# Patient Record
Sex: FEMALE | Race: WHITE | NOT HISPANIC OR LATINO | Employment: OTHER | URBAN - METROPOLITAN AREA
[De-identification: names, ages, dates, MRNs, and addresses within clinical notes are randomized per-mention and may not be internally consistent; named-entity substitution may affect disease eponyms.]

---

## 2017-01-31 RX ORDER — OMEPRAZOLE 20 MG/1
CAPSULE, DELAYED RELEASE ORAL
Qty: 90 CAP | Refills: 1 | Status: SHIPPED | OUTPATIENT
Start: 2017-01-31 | End: 2017-08-01 | Stop reason: SDUPTHER

## 2017-01-31 NOTE — TELEPHONE ENCOUNTER
Desi- You have upcoming appt with pt and have never prescribed for this pt before, please refill as you see fit.

## 2017-01-31 NOTE — TELEPHONE ENCOUNTER
Was the patient seen in the last year in this department? Yes     Does patient have an active prescription for medications requested? No     Received Request Via: Pharmacy      Pt met protocol?: Yes    Jeb wilde

## 2017-02-17 PROBLEM — Z79.891 CHRONIC USE OF OPIATE DRUGS THERAPEUTIC PURPOSES: Status: ACTIVE | Noted: 2017-02-17

## 2017-04-03 ENCOUNTER — HOSPITAL ENCOUNTER (OUTPATIENT)
Dept: RADIOLOGY | Facility: MEDICAL CENTER | Age: 52
End: 2017-04-03

## 2017-04-19 ENCOUNTER — OFFICE VISIT (OUTPATIENT)
Dept: MEDICAL GROUP | Facility: PHYSICIAN GROUP | Age: 52
End: 2017-04-19
Payer: COMMERCIAL

## 2017-04-19 VITALS
WEIGHT: 293 LBS | TEMPERATURE: 98.4 F | BODY MASS INDEX: 48.82 KG/M2 | HEIGHT: 65 IN | OXYGEN SATURATION: 92 % | DIASTOLIC BLOOD PRESSURE: 32 MMHG | SYSTOLIC BLOOD PRESSURE: 131 MMHG | RESPIRATION RATE: 14 BRPM | HEART RATE: 88 BPM

## 2017-04-19 DIAGNOSIS — Z79.891 CHRONIC USE OF OPIATE DRUGS THERAPEUTIC PURPOSES: ICD-10-CM

## 2017-04-19 DIAGNOSIS — M47.812 DJD (DEGENERATIVE JOINT DISEASE), CERVICAL: ICD-10-CM

## 2017-04-19 PROCEDURE — 99213 OFFICE O/P EST LOW 20 MIN: CPT | Performed by: INTERNAL MEDICINE

## 2017-04-19 ASSESSMENT — PAIN SCALES - GENERAL: PAINLEVEL: 5=MODERATE PAIN

## 2017-04-19 NOTE — PROGRESS NOTES
Chief Complaint   Patient presents with   • Follow-Up     surgical clearance       HISTORY OF PRESENT ILLNESS: Patient is a 51 y.o. female patient who presents today to discuss the evaluation and management of:    1. DJD (degenerative joint disease), cervical    Patient is scheduled for cervical spine fusion by Dr. Benítez in approximately 3 weeks' time. She is here today for surgical clearance. She has had several previous procedures done however has developed pain in her neck radiating down her left arm that is been resistant to medication and to recent steroid injections. Patient does not have a personal history of heart disease, diabetes mellitus, peripheral vascular disease, asthma/emphysema, or syncope. She is hypothyroid however clinically euthyroid with last TSH within normal limits. Her primary problem has been chronic pain from her cervical spine and lumbar areas.    2. Chronic use of opiate drugs therapeutic purposes    Patient currently takes Flexeril and Norco 10/325 3 times a day. She also takes Lyrica twice a day. Her last urine drug screen was 12 2016. She has an appointment next week to see Dr. Osman for refill of her medications. Her last refills were in December through Dr. Russ.      Patient Active Problem List    Diagnosis Date Noted   • Chronic use of opiate drugs therapeutic purposes 02/17/2017   • Thyroid activity decreased 12/18/2015   • Other osteoarthritis of spine, lumbar region 08/28/2015   • Other migraine without status migrainosus, not intractable 08/28/2015   • Chronic pain 12/02/2014   • Long term current use of opiate analgesic 12/02/2014   • Fibromyalgia 04/04/2013   • DJD (degenerative joint disease), cervical 04/04/2013   • Myalgia and myositis 09/19/2011   • ENDOMETRIOSIS 09/13/2011   • PCOS (polycystic ovarian syndrome) 02/01/2011   • Family history of diabetes mellitus (DM) 02/01/2011   • Anxiety disorder 02/01/2011   • GERD (gastroesophageal reflux disease) 02/01/2011       Allergies:Latex    Current meds including changes today  Current Outpatient Prescriptions   Medication Sig Dispense Refill   • omeprazole (PRILOSEC) 20 MG delayed-release capsule TAKE ONE CAPSULE BY MOUTH DAILY 90 Cap 1   • sertraline (ZOLOFT) 100 MG Tab TAKE TWO TABLETS BY MOUTH DAILY 180 Tab 4   • trazodone (DESYREL) 50 MG Tab TAKE ONE TABLET BY MOUTH AT BEDTIME 90 Tab 4   • hydrocodone/acetaminophen (NORCO)  MG Tab Take 1 Tab by mouth every 6 hours as needed. 120 Tab 0   • hydrocodone/acetaminophen (NORCO)  MG Tab Take 1 Tab by mouth every 6 hours as needed. 120 Tab 0   • hydrocodone/acetaminophen (NORCO)  MG Tab Take 1 Tab by mouth every 6 hours as needed. 120 Tab 0   • pregabalin (LYRICA) 150 MG Cap Take 1 Cap by mouth 3 times a day. 270 Cap 1   • ibuprofen (MOTRIN) 800 MG Tab TAKE ONE TABLET BY MOUTH EVERY 8 HOURS AS NEEDED FOR MILD PAIN 270 Tab 3   • levothyroxine (SYNTHROID) 25 MCG Tab TAKE ONE TABLET BY MOUTH DAILY WITH A 200 MCG TABLET 90 Tab 3   • levothyroxine (SYNTHROID) 200 MCG Tab TAKE ONE TABLET BY MOUTH EVERY MORNING ON AN EMPTY STOMACH 90 Tab 3   • cyclobenzaprine (FLEXERIL) 10 MG Tab TAKE ONE TABLET BY MOUTH THREE TIMES A DAY AS NEEDED FOR MUSCLE SPASMS 270 Tab 3   • ranitidine (ZANTAC) 150 MG capsule Take 2 Caps by mouth 2 Times a Day. 360 Cap 4   • topiramate (TOPAMAX) 50 MG tablet TAKE ONE TABLET BY MOUTH ONCE A DAY 90 Tab 4   • norethindrone-ethinyl estradiol (CYCLAFEM 1/35) 1-35 MG-MCG per tablet TAKE ONE TABLET BY MOUTH ONCE A DAY 84 Tab 5   • POTASSIUM CHLORIDE by Does not apply route.     • oxycodone (OXY-IR) 15 MG immediate release tablet Take 15 mg by mouth every four hours as needed.     • BIOTIN by Does not apply route.       No current facility-administered medications for this visit.     Social History   Substance Use Topics   • Smoking status: Never Smoker    • Smokeless tobacco: Never Used      Comment: avoid all tobacco products   • Alcohol Use: 0.0 oz/week      "0 Standard drinks or equivalent per week      Comment: occasionally     Social History     Social History Narrative       Family History   Problem Relation Age of Onset   • Hyperlipidemia Mother    • Diabetes Sister    • Hypertension Sister    • Hyperlipidemia Sister    • Diabetes Brother    • Hypertension Brother    • Hyperlipidemia Brother    • Heart Disease Maternal Aunt    • Heart Disease Maternal Grandmother    • Diabetes Paternal Grandmother    • Cancer Neg Hx    • Stroke Neg Hx        Review of Systems:  No chest pain, No shortness of breath, No dyspnea on exertion  Gastrointestinal ROS: No abdominal pain, No nausea, vomiting, diarrhea, or constipation        Exam:    Obese pleasant female no distress  Blood pressure 131/32, pulse 88, temperature 36.9 °C (98.4 °F), resp. rate 14, height 1.651 m (5' 5\"), weight 138.347 kg (305 lb), SpO2 92 %.  General:  Well nourished, well developed female in NAD affect and mood within normal limits  Head is grossly normal.  Neck: Supple without adenopathy  Pulmonary: Clear to ausculation.  Normal effort. No rales, rhonchi, or wheezing.  Cardiovascular: Regular rate and rhythm without murmur.   Extremities: no clubbing, cyanosis, or edema.  Neuro: moves all extremities symmetrically    Please note that this dictation was created using voice recognition software. I have made every reasonable attempt to correct obvious errors, but I expect that there are errors of grammar and possibly content that I did not discover before finalizing the note.    Assessment/Plan:  1. DJD (degenerative joint disease), cervical    Patient is cleared for the proposed cervical spine surgery in the low risk category. She will have her preop labs performed at her surgeon's office.    2. Chronic use of opiate drugs therapeutic purposes    Continue current medical regimen, patient has upcoming appointment with her PCP for refills.    Followup: Patient has appointment with PCP next week.      "

## 2017-04-19 NOTE — MR AVS SNAPSHOT
"        Gail Elias   2017 3:00 PM   Office Visit   MRN: [id number]    Department:  [facility]   Dept Phone:  [phone]    Description:  Female : 1965   Provider:  Bertha Hui M.D.           Reason for Visit     Follow-Up surgical clearance      Allergies as of 2017     Allergen Noted Reactions    Latex 2012   Hives      You were diagnosed with     DJD (degenerative joint disease), cervical   [534205]       Chronic use of opiate drugs therapeutic purposes   [2355293]         Vital Signs     Blood Pressure Pulse Temperature Respirations Height Weight    131/32 mmHg 88 36.9 °C (98.4 °F) 14 1.651 m (5' 5\") 138.347 kg (305 lb)    Body Mass Index Oxygen Saturation Smoking Status             50.75 kg/m2 92% Never Smoker          Basic Information     Date Of Birth Sex Race Ethnicity Preferred Language    1965 Female White Non- English      Your appointments     2017  3:20 PM   Established Patient with Desi Osman D.O.   Formerly [facility])    [address] [place] [address], [address]  [facility] [id number] [phone]           You will be receiving a confirmation call a few days before your appointment from our automated call confirmation system.              Problem List              ICD-10-CM Priority Class Noted - Resolved    PCOS (polycystic ovarian syndrome) E28.2   2011 - Present    Family history of diabetes mellitus (DM) Z83.3   2011 - Present    Anxiety disorder F41.9   2011 - Present    GERD (gastroesophageal reflux disease) K21.9   2011 - Present    ENDOMETRIOSIS    2011 - Present    Myalgia and myositis ARI3107   2011 - Present    Fibromyalgia M79.7   2013 - Present    DJD (degenerative joint disease), cervical M50.30   2013 - Present    Chronic pain G89.29   2014 - Present    Long term current use of opiate analgesic Z79.891   2014 - Present    Other osteoarthritis of spine, " lumbar region M47.896   8/28/2015 - Present    Other migraine without status migrainosus, not intractable G43.809   8/28/2015 - Present    Thyroid activity decreased E03.9   12/18/2015 - Present    Chronic use of opiate drugs therapeutic purposes Z79.899   2/17/2017 - Present      Health Maintenance        Date Due Completion Dates    PAP SMEAR 12/5/2017 (Originally 8/26/1986) ---    MAMMOGRAM 12/6/2017 (Originally 8/26/2005) ---    COLONOSCOPY 12/13/2017 (Originally 8/26/2015) ---    IMM DTaP/Tdap/Td Vaccine (2 - Td) 4/9/2025 4/9/2015            Current Immunizations     Influenza Vaccine Adult HD 12/21/2016    Influenza Vaccine Quad Inj (Pf) 12/2/2014    Influenza Vaccine Quad Inj (Preserved) 12/11/2015    Tdap Vaccine 4/9/2015      Below and/or attached are the medications your provider expects you to take. Review all of your home medications and newly ordered medications with your provider and/or pharmacist. Follow medication instructions as directed by your provider and/or pharmacist. Please keep your medication list with you and share with your provider. Update the information when medications are discontinued, doses are changed, or new medications (including over-the-counter products) are added; and carry medication information at all times in the event of emergency situations     Allergies:  LATEX - Hives               Medications  Valid as of: April 19, 2017 -  4:30 PM    Generic Name Brand Name Tablet Size Instructions for use    Biotin   by Does not apply route.        Cyclobenzaprine HCl (Tab) FLEXERIL 10 MG TAKE ONE TABLET BY MOUTH THREE TIMES A DAY AS NEEDED FOR MUSCLE SPASMS        Hydrocodone-Acetaminophen (Tab) NORCO  MG Take 1 Tab by mouth every 6 hours as needed.        Hydrocodone-Acetaminophen (Tab) NORCO  MG Take 1 Tab by mouth every 6 hours as needed.        Hydrocodone-Acetaminophen (Tab) NORCO  MG Take 1 Tab by mouth every 6 hours as needed.        Ibuprofen (Tab) MOTRIN  800 MG TAKE ONE TABLET BY MOUTH EVERY 8 HOURS AS NEEDED FOR MILD PAIN        Levothyroxine Sodium (Tab) SYNTHROID 25 MCG TAKE ONE TABLET BY MOUTH DAILY WITH A 200 MCG TABLET        Levothyroxine Sodium (Tab) SYNTHROID 200 MCG TAKE ONE TABLET BY MOUTH EVERY MORNING ON AN EMPTY STOMACH        Norethindrone-Eth Estradiol (Tab) ORTHO-NOVUM 1-35 TAB, NORTREL 1-35 TAB 1-35 MG-MCG TAKE ONE TABLET BY MOUTH ONCE A DAY        Omeprazole (CAPSULE DELAYED RELEASE) PRILOSEC 20 MG TAKE ONE CAPSULE BY MOUTH DAILY        OxyCODONE HCl (Tab) OXY-IR 15 MG Take 15 mg by mouth every four hours as needed.        Potassium Chloride   by Does not apply route.        Pregabalin (Cap) LYRICA 150 MG Take 1 Cap by mouth 3 times a day.        RaNITidine HCl (Cap) ZANTAC 150 MG Take 2 Caps by mouth 2 Times a Day.        Sertraline HCl (Tab) ZOLOFT 100 MG TAKE TWO TABLETS BY MOUTH DAILY        Topiramate (Tab) TOPAMAX 50 MG TAKE ONE TABLET BY MOUTH ONCE A DAY        TraZODone HCl (Tab) DESYREL 50 MG TAKE ONE TABLET BY MOUTH AT BEDTIME        .                 Medicines prescribed today were sent to:     [facility] #[id number] - [place], [place] - [address]    [address] [facility] [address]    Phone: [phone] Fax: [phone]    Open 24 Hours?: No      Medication refill instructions:       If your prescription bottle indicates you have medication refills left, it is not necessary to call your provider’s office. Please contact your pharmacy and they will refill your medication.    If your prescription bottle indicates you do not have any refills left, you may request refills at any time through one of the following ways: The online [employer] system (except Urgent Care), by calling your provider’s office, or by asking your pharmacy to contact your provider’s office with a refill request. Medication refills are processed only during regular business hours and may not be available until the next business day. Your provider may request additional  information or to have a follow-up visit with you prior to refilling your medication.   *Please Note: Medication refills are assigned a new Rx number when refilled electronically. Your pharmacy may indicate that no refills were authorized even though a new prescription for the same medication is available at the pharmacy. Please request the medicine by name with the pharmacy before contacting your provider for a refill.           [employer] Access Code: Activation code not generated  Current [employer] Status: Active

## 2017-04-24 ENCOUNTER — OFFICE VISIT (OUTPATIENT)
Dept: MEDICAL GROUP | Facility: PHYSICIAN GROUP | Age: 52
End: 2017-04-24
Payer: COMMERCIAL

## 2017-04-24 VITALS
BODY MASS INDEX: 48.82 KG/M2 | HEIGHT: 65 IN | SYSTOLIC BLOOD PRESSURE: 130 MMHG | WEIGHT: 293 LBS | OXYGEN SATURATION: 89 % | TEMPERATURE: 97.9 F | HEART RATE: 89 BPM | DIASTOLIC BLOOD PRESSURE: 70 MMHG | RESPIRATION RATE: 16 BRPM

## 2017-04-24 DIAGNOSIS — M47.896 OTHER OSTEOARTHRITIS OF SPINE, LUMBAR REGION: ICD-10-CM

## 2017-04-24 DIAGNOSIS — Z79.891 CHRONIC USE OF OPIATE DRUGS THERAPEUTIC PURPOSES: ICD-10-CM

## 2017-04-24 DIAGNOSIS — M47.812 DJD (DEGENERATIVE JOINT DISEASE), CERVICAL: ICD-10-CM

## 2017-04-24 DIAGNOSIS — M79.7 FIBROMYALGIA: ICD-10-CM

## 2017-04-24 PROCEDURE — 99214 OFFICE O/P EST MOD 30 MIN: CPT | Performed by: FAMILY MEDICINE

## 2017-04-24 RX ORDER — PREGABALIN 150 MG/1
150 CAPSULE ORAL 3 TIMES DAILY
Qty: 270 CAP | Refills: 1 | Status: SHIPPED | OUTPATIENT
Start: 2017-04-24 | End: 2018-01-05 | Stop reason: SDUPTHER

## 2017-04-24 RX ORDER — HYDROCODONE BITARTRATE AND ACETAMINOPHEN 10; 325 MG/1; MG/1
1 TABLET ORAL EVERY 6 HOURS PRN
Qty: 120 TAB | Refills: 0 | Status: SHIPPED | OUTPATIENT
Start: 2017-04-24 | End: 2017-09-20 | Stop reason: SDUPTHER

## 2017-04-24 NOTE — MR AVS SNAPSHOT
"        Gail Elias   2017 3:20 PM   Office Visit   MRN: [id number]    Department:  [facility]   Dept Phone:  [phone]    Description:  Female : 1965   Provider:  Desi Osman D.O.           Reason for Visit     Medication Refill           Allergies as of 2017     Allergen Noted Reactions    Latex 2012   Hives      You were diagnosed with     Chronic use of opiate drugs therapeutic purposes   [2421507]       DJD (degenerative joint disease), cervical   [911951]       Other osteoarthritis of spine, lumbar region   [0543098]       Fibromyalgia   [860492]         Vital Signs     Blood Pressure Pulse Temperature Respirations Height Weight    130/70 mmHg 89 36.6 °C (97.9 °F) 16 1.651 m (5' 5\") 138.347 kg (305 lb)    Body Mass Index Oxygen Saturation Smoking Status             50.75 kg/m2 89% Never Smoker          Basic Information     Date Of Birth Sex Race Ethnicity Preferred Language    1965 Female White Non- English      Problem List              ICD-10-CM Priority Class Noted - Resolved    PCOS (polycystic ovarian syndrome) E28.2   2011 - Present    Family history of diabetes mellitus (DM) Z83.3   2011 - Present    Anxiety disorder F41.9   2011 - Present    GERD (gastroesophageal reflux disease) K21.9   2011 - Present    ENDOMETRIOSIS    2011 - Present    Myalgia and myositis OHV7357   2011 - Present    Fibromyalgia M79.7   2013 - Present    DJD (degenerative joint disease), cervical M50.30   2013 - Present    Chronic pain G89.29   2014 - Present    Other osteoarthritis of spine, lumbar region M47.896   2015 - Present    Other migraine without status migrainosus, not intractable G43.809   2015 - Present    Thyroid activity decreased E03.9   2015 - Present    Chronic use of opiate drugs therapeutic purposes Z79.899   2017 - Present      Health Maintenance        Date Due Completion Dates    PAP SMEAR " 12/5/2017 (Originally 8/26/1986) ---    MAMMOGRAM 12/6/2017 (Originally 8/26/2005) ---    COLONOSCOPY 12/13/2017 (Originally 8/26/2015) ---    IMM DTaP/Tdap/Td Vaccine (2 - Td) 4/9/2025 4/9/2015            Current Immunizations     Influenza Vaccine Adult HD 12/21/2016    Influenza Vaccine Quad Inj (Pf) 12/2/2014    Influenza Vaccine Quad Inj (Preserved) 12/11/2015    Tdap Vaccine 4/9/2015      Below and/or attached are the medications your provider expects you to take. Review all of your home medications and newly ordered medications with your provider and/or pharmacist. Follow medication instructions as directed by your provider and/or pharmacist. Please keep your medication list with you and share with your provider. Update the information when medications are discontinued, doses are changed, or new medications (including over-the-counter products) are added; and carry medication information at all times in the event of emergency situations     Allergies:  LATEX - Hives               Medications  Valid as of: April 24, 2017 -  6:09 PM    Generic Name Brand Name Tablet Size Instructions for use    Biotin   by Does not apply route.        Cyclobenzaprine HCl (Tab) FLEXERIL 10 MG TAKE ONE TABLET BY MOUTH THREE TIMES A DAY AS NEEDED FOR MUSCLE SPASMS        Hydrocodone-Acetaminophen (Tab) NORCO  MG Take 1 Tab by mouth every 6 hours as needed.        Hydrocodone-Acetaminophen (Tab) NORCO  MG Take 1 Tab by mouth every 6 hours as needed.        Hydrocodone-Acetaminophen (Tab) NORCO  MG Take 1 Tab by mouth every 6 hours as needed.        Ibuprofen (Tab) MOTRIN 800 MG TAKE ONE TABLET BY MOUTH EVERY 8 HOURS AS NEEDED FOR MILD PAIN        Levothyroxine Sodium (Tab) SYNTHROID 25 MCG TAKE ONE TABLET BY MOUTH DAILY WITH A 200 MCG TABLET        Levothyroxine Sodium (Tab) SYNTHROID 200 MCG TAKE ONE TABLET BY MOUTH EVERY MORNING ON AN EMPTY STOMACH        Omeprazole (CAPSULE DELAYED RELEASE) PRILOSEC 20 MG TAKE  ONE CAPSULE BY MOUTH DAILY        OxyCODONE HCl (Tab) OXY-IR 15 MG Take 15 mg by mouth every four hours as needed.        Potassium Chloride   by Does not apply route.        Pregabalin (Cap) LYRICA 150 MG Take 1 Cap by mouth 3 times a day.        RaNITidine HCl (Cap) ZANTAC 150 MG Take 2 Caps by mouth 2 Times a Day.        Sertraline HCl (Tab) ZOLOFT 100 MG TAKE TWO TABLETS BY MOUTH DAILY        Topiramate (Tab) TOPAMAX 50 MG TAKE ONE TABLET BY MOUTH ONCE A DAY        TraZODone HCl (Tab) DESYREL 50 MG TAKE ONE TABLET BY MOUTH AT BEDTIME        .                 Medicines prescribed today were sent to:     [facility] #[id number] - [place], [place] - [address]    [address] [facility] [address]    Phone: [phone] Fax: [phone]    Open 24 Hours?: No      Medication refill instructions:       If your prescription bottle indicates you have medication refills left, it is not necessary to call your provider’s office. Please contact your pharmacy and they will refill your medication.    If your prescription bottle indicates you do not have any refills left, you may request refills at any time through one of the following ways: The online [employer] system (except Urgent Care), by calling your provider’s office, or by asking your pharmacy to contact your provider’s office with a refill request. Medication refills are processed only during regular business hours and may not be available until the next business day. Your provider may request additional information or to have a follow-up visit with you prior to refilling your medication.   *Please Note: Medication refills are assigned a new Rx number when refilled electronically. Your pharmacy may indicate that no refills were authorized even though a new prescription for the same medication is available at the pharmacy. Please request the medicine by name with the pharmacy before contacting your provider for a refill.           [employer] Access Code: Activation code not  generated  Current MyChart Status: Active
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
## 2017-04-25 ASSESSMENT — ENCOUNTER SYMPTOMS: DEPRESSION: 1

## 2017-04-25 ASSESSMENT — LIFESTYLE VARIABLES: HISTORY_ALCOHOL_USE: 0

## 2017-05-19 ENCOUNTER — TELEPHONE (OUTPATIENT)
Dept: MEDICAL GROUP | Facility: PHYSICIAN GROUP | Age: 52
End: 2017-05-19

## 2017-05-19 NOTE — TELEPHONE ENCOUNTER
Dr Zuleta office called asking if pt is cleared for surgery.    I called at [facility] with MA to see what surgery pt is having, waiting for call back.

## 2017-06-02 RX ORDER — TOPIRAMATE 50 MG/1
TABLET, FILM COATED ORAL
Qty: 90 TAB | Refills: 1 | Status: SHIPPED | OUTPATIENT
Start: 2017-06-02 | End: 2017-12-02 | Stop reason: SDUPTHER

## 2017-08-02 RX ORDER — OMEPRAZOLE 20 MG/1
CAPSULE, DELAYED RELEASE ORAL
Qty: 90 CAP | Refills: 1 | Status: SHIPPED | OUTPATIENT
Start: 2017-08-02 | End: 2018-01-27 | Stop reason: SDUPTHER

## 2017-08-18 ENCOUNTER — APPOINTMENT (OUTPATIENT)
Dept: MEDICAL GROUP | Facility: PHYSICIAN GROUP | Age: 52
End: 2017-08-18
Payer: COMMERCIAL

## 2017-09-07 RX ORDER — LEVOTHYROXINE SODIUM 0.2 MG/1
TABLET ORAL
Qty: 90 TAB | Refills: 0 | Status: SHIPPED | OUTPATIENT
Start: 2017-09-07 | End: 2018-05-14 | Stop reason: SDUPTHER

## 2017-09-07 RX ORDER — LEVOTHYROXINE SODIUM 0.03 MG/1
TABLET ORAL
Qty: 90 TAB | Refills: 3 | Status: SHIPPED | OUTPATIENT
Start: 2017-09-07 | End: 2018-05-14

## 2017-09-20 ENCOUNTER — OFFICE VISIT (OUTPATIENT)
Dept: MEDICAL GROUP | Facility: PHYSICIAN GROUP | Age: 52
End: 2017-09-20
Payer: COMMERCIAL

## 2017-09-20 VITALS
SYSTOLIC BLOOD PRESSURE: 122 MMHG | HEIGHT: 65 IN | DIASTOLIC BLOOD PRESSURE: 80 MMHG | OXYGEN SATURATION: 88 % | BODY MASS INDEX: 48.82 KG/M2 | TEMPERATURE: 98.1 F | RESPIRATION RATE: 16 BRPM | HEART RATE: 86 BPM | WEIGHT: 293 LBS

## 2017-09-20 DIAGNOSIS — Z79.891 CHRONIC USE OF OPIATE DRUGS THERAPEUTIC PURPOSES: ICD-10-CM

## 2017-09-20 DIAGNOSIS — M47.812 DJD (DEGENERATIVE JOINT DISEASE), CERVICAL: ICD-10-CM

## 2017-09-20 DIAGNOSIS — Z23 NEED FOR INFLUENZA VACCINATION: ICD-10-CM

## 2017-09-20 DIAGNOSIS — M51.36 DDD (DEGENERATIVE DISC DISEASE), LUMBAR: ICD-10-CM

## 2017-09-20 DIAGNOSIS — E28.2 PCOS (POLYCYSTIC OVARIAN SYNDROME): ICD-10-CM

## 2017-09-20 PROCEDURE — 99214 OFFICE O/P EST MOD 30 MIN: CPT | Mod: 25 | Performed by: FAMILY MEDICINE

## 2017-09-20 RX ORDER — HYDROCODONE BITARTRATE AND ACETAMINOPHEN 10; 325 MG/1; MG/1
1 TABLET ORAL EVERY 6 HOURS PRN
Qty: 120 TAB | Refills: 0 | Status: CANCELLED | OUTPATIENT
Start: 2017-09-20

## 2017-09-20 RX ORDER — HYDROCODONE BITARTRATE AND ACETAMINOPHEN 10; 325 MG/1; MG/1
1 TABLET ORAL EVERY 6 HOURS PRN
Qty: 120 TAB | Refills: 0 | Status: SHIPPED | OUTPATIENT
Start: 2017-09-20 | End: 2017-12-28 | Stop reason: SDUPTHER

## 2017-09-20 RX ORDER — HYDROCODONE BITARTRATE AND ACETAMINOPHEN 10; 325 MG/1; MG/1
1 TABLET ORAL EVERY 6 HOURS PRN
Qty: 120 TAB | Refills: 0 | Status: SHIPPED | OUTPATIENT
Start: 2017-09-20 | End: 2017-11-01 | Stop reason: SDUPTHER
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
## 2017-09-25 PROCEDURE — 90686 IIV4 VACC NO PRSV 0.5 ML IM: CPT | Performed by: FAMILY MEDICINE

## 2017-09-25 PROCEDURE — 90471 IMMUNIZATION ADMIN: CPT | Performed by: FAMILY MEDICINE

## 2017-09-26 PROBLEM — M51.369 DDD (DEGENERATIVE DISC DISEASE), LUMBAR: Status: ACTIVE | Noted: 2017-09-26

## 2017-09-26 PROBLEM — M51.36 DDD (DEGENERATIVE DISC DISEASE), LUMBAR: Status: ACTIVE | Noted: 2017-09-26

## 2017-11-01 DIAGNOSIS — M47.812 DJD (DEGENERATIVE JOINT DISEASE), CERVICAL: ICD-10-CM

## 2017-11-01 RX ORDER — HYDROCODONE BITARTRATE AND ACETAMINOPHEN 10; 325 MG/1; MG/1
1 TABLET ORAL EVERY 6 HOURS PRN
Qty: 120 TAB | Refills: 0 | Status: SHIPPED | OUTPATIENT
Start: 2017-11-01 | End: 2017-12-28 | Stop reason: SDUPTHER

## 2017-11-30 DIAGNOSIS — M47.896 OTHER OSTEOARTHRITIS OF SPINE, LUMBAR REGION: ICD-10-CM

## 2017-12-01 RX ORDER — CYCLOBENZAPRINE HCL 10 MG
TABLET ORAL
Qty: 270 TAB | Refills: 1 | Status: SHIPPED
Start: 2017-12-01 | End: 2018-05-29 | Stop reason: SDUPTHER

## 2017-12-01 NOTE — TELEPHONE ENCOUNTER
Was the patient seen in the last year in this department? Yes     Does patient have an active prescription for medications requested? No     Received Request Via: Pharmacy      Pt met protocol?: Yes pt last ov 9/2017

## 2017-12-04 RX ORDER — TOPIRAMATE 50 MG/1
TABLET, FILM COATED ORAL
Qty: 90 TAB | Refills: 1 | Status: SHIPPED | OUTPATIENT
Start: 2017-12-04 | End: 2018-06-14 | Stop reason: SDUPTHER

## 2017-12-04 NOTE — TELEPHONE ENCOUNTER
Was the patient seen in the last year in this department? Yes     Does patient have an active prescription for medications requested? No     Received Request Via: Pharmacy      Pt met protocol?: Yes    LAST OV 09/20/2017

## 2017-12-28 ENCOUNTER — OFFICE VISIT (OUTPATIENT)
Dept: MEDICAL GROUP | Facility: PHYSICIAN GROUP | Age: 52
End: 2017-12-28
Payer: COMMERCIAL

## 2017-12-28 VITALS
DIASTOLIC BLOOD PRESSURE: 68 MMHG | OXYGEN SATURATION: 96 % | HEART RATE: 78 BPM | WEIGHT: 293 LBS | HEIGHT: 65 IN | SYSTOLIC BLOOD PRESSURE: 130 MMHG | TEMPERATURE: 98.8 F | BODY MASS INDEX: 48.82 KG/M2

## 2017-12-28 DIAGNOSIS — E03.8 OTHER SPECIFIED HYPOTHYROIDISM: ICD-10-CM

## 2017-12-28 DIAGNOSIS — Z79.891 CHRONIC USE OF OPIATE DRUGS THERAPEUTIC PURPOSES: ICD-10-CM

## 2017-12-28 DIAGNOSIS — M51.36 DDD (DEGENERATIVE DISC DISEASE), LUMBAR: ICD-10-CM

## 2017-12-28 DIAGNOSIS — M47.812 DJD (DEGENERATIVE JOINT DISEASE), CERVICAL: ICD-10-CM

## 2017-12-28 DIAGNOSIS — R07.89 ATYPICAL CHEST PAIN: ICD-10-CM

## 2017-12-28 DIAGNOSIS — Z13.6 SCREENING FOR CARDIOVASCULAR CONDITION: ICD-10-CM

## 2017-12-28 DIAGNOSIS — M47.812 SPONDYLOSIS OF CERVICAL REGION WITHOUT MYELOPATHY OR RADICULOPATHY: ICD-10-CM

## 2017-12-28 PROCEDURE — 99214 OFFICE O/P EST MOD 30 MIN: CPT | Performed by: FAMILY MEDICINE

## 2017-12-28 RX ORDER — HYDROCODONE BITARTRATE AND ACETAMINOPHEN 10; 325 MG/1; MG/1
1 TABLET ORAL EVERY 6 HOURS PRN
Qty: 120 TAB | Refills: 0 | Status: SHIPPED | OUTPATIENT
Start: 2018-01-03 | End: 2018-02-02

## 2017-12-28 RX ORDER — HYDROCODONE BITARTRATE AND ACETAMINOPHEN 10; 325 MG/1; MG/1
1 TABLET ORAL EVERY 6 HOURS PRN
Qty: 120 TAB | Refills: 0 | Status: SHIPPED | OUTPATIENT
Start: 2018-03-04 | End: 2018-04-03

## 2017-12-28 RX ORDER — HYDROCODONE BITARTRATE AND ACETAMINOPHEN 10; 325 MG/1; MG/1
1 TABLET ORAL EVERY 6 HOURS PRN
Qty: 120 TAB | Refills: 0 | Status: SHIPPED | OUTPATIENT
Start: 2018-02-02 | End: 2018-03-04

## 2017-12-28 ASSESSMENT — PATIENT HEALTH QUESTIONNAIRE - PHQ9: CLINICAL INTERPRETATION OF PHQ2 SCORE: 0
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
## 2017-12-30 ENCOUNTER — TELEPHONE (OUTPATIENT)
Dept: MEDICAL GROUP | Facility: MEDICAL CENTER | Age: 52
End: 2017-12-30

## 2017-12-31 NOTE — TELEPHONE ENCOUNTER
Pt and pharmacy calling regarding prescription of lyrica.  It was supposed to be sent on Thursday but pharmacy has not received it.    Gave verbal approval for lyrica 150mg tid for a one month supply.  Pharmacy will let pt know that pt should contact her pcp for future refills

## 2018-01-05 DIAGNOSIS — M79.7 FIBROMYALGIA: ICD-10-CM

## 2018-01-05 RX ORDER — PREGABALIN 150 MG/1
150 CAPSULE ORAL 3 TIMES DAILY
Qty: 180 CAP | Refills: 0 | Status: SHIPPED
Start: 2018-01-30 | End: 2018-04-20 | Stop reason: SDUPTHER

## 2018-01-29 RX ORDER — OMEPRAZOLE 20 MG/1
CAPSULE, DELAYED RELEASE ORAL
Qty: 90 CAP | Refills: 0 | Status: SHIPPED | OUTPATIENT
Start: 2018-01-29 | End: 2018-04-27 | Stop reason: SDUPTHER

## 2018-01-29 NOTE — TELEPHONE ENCOUNTER
Was the patient seen in the last year in this department? Yes     Does patient have an active prescription for medications requested? No     Received Request Via: Pharmacy      Pt met protocol?: Yes    LAST OV 12/28/2017

## 2018-02-01 ENCOUNTER — OFFICE VISIT (OUTPATIENT)
Dept: MEDICAL GROUP | Facility: MEDICAL CENTER | Age: 53
End: 2018-02-01
Payer: COMMERCIAL

## 2018-02-01 VITALS
DIASTOLIC BLOOD PRESSURE: 94 MMHG | HEART RATE: 94 BPM | BODY MASS INDEX: 48.82 KG/M2 | OXYGEN SATURATION: 96 % | TEMPERATURE: 99 F | WEIGHT: 293 LBS | HEIGHT: 65 IN | RESPIRATION RATE: 16 BRPM | SYSTOLIC BLOOD PRESSURE: 154 MMHG

## 2018-02-01 DIAGNOSIS — Z79.891 CHRONIC USE OF OPIATE DRUGS THERAPEUTIC PURPOSES: ICD-10-CM

## 2018-02-01 DIAGNOSIS — K21.9 GASTROESOPHAGEAL REFLUX DISEASE WITHOUT ESOPHAGITIS: ICD-10-CM

## 2018-02-01 DIAGNOSIS — M47.812 DJD (DEGENERATIVE JOINT DISEASE), CERVICAL: ICD-10-CM

## 2018-02-01 DIAGNOSIS — Z12.31 ENCOUNTER FOR SCREENING MAMMOGRAM FOR BREAST CANCER: ICD-10-CM

## 2018-02-01 DIAGNOSIS — F06.4 ANXIETY DISORDER DUE TO KNOWN PHYSIOLOGICAL CONDITION: ICD-10-CM

## 2018-02-01 DIAGNOSIS — E03.9 ACQUIRED HYPOTHYROIDISM: ICD-10-CM

## 2018-02-01 DIAGNOSIS — M79.7 FIBROMYALGIA: ICD-10-CM

## 2018-02-01 DIAGNOSIS — E66.01 MORBID OBESITY WITH BMI OF 50.0-59.9, ADULT (HCC): ICD-10-CM

## 2018-02-01 DIAGNOSIS — Z76.89 ESTABLISHING CARE WITH NEW DOCTOR, ENCOUNTER FOR: ICD-10-CM

## 2018-02-01 PROCEDURE — 99214 OFFICE O/P EST MOD 30 MIN: CPT | Performed by: INTERNAL MEDICINE
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
## 2018-03-22 DIAGNOSIS — F32.5 MAJOR DEPRESSION IN REMISSION (HCC): ICD-10-CM

## 2018-03-22 RX ORDER — SERTRALINE HYDROCHLORIDE 100 MG/1
TABLET, FILM COATED ORAL
Qty: 180 TAB | Refills: 1 | Status: SHIPPED | OUTPATIENT
Start: 2018-03-22 | End: 2018-09-16 | Stop reason: SDUPTHER

## 2018-03-22 RX ORDER — TRAZODONE HYDROCHLORIDE 50 MG/1
TABLET ORAL
Refills: 3 | OUTPATIENT
Start: 2018-03-22

## 2018-03-22 RX ORDER — TRAZODONE HYDROCHLORIDE 50 MG/1
TABLET ORAL
Qty: 90 TAB | Refills: 1 | Status: SHIPPED | OUTPATIENT
Start: 2018-03-22 | End: 2018-09-16 | Stop reason: SDUPTHER

## 2018-03-22 RX ORDER — SERTRALINE HYDROCHLORIDE 100 MG/1
TABLET, FILM COATED ORAL
Refills: 3 | OUTPATIENT
Start: 2018-03-22

## 2018-04-03 ENCOUNTER — APPOINTMENT (OUTPATIENT)
Dept: MEDICAL GROUP | Facility: MEDICAL CENTER | Age: 53
End: 2018-04-03
Payer: COMMERCIAL

## 2018-04-20 DIAGNOSIS — M79.7 FIBROMYALGIA: ICD-10-CM

## 2018-04-20 RX ORDER — PREGABALIN 150 MG/1
150 CAPSULE ORAL 3 TIMES DAILY
Qty: 180 CAP | Refills: 0 | Status: SHIPPED
Start: 2018-04-20 | End: 2018-04-24 | Stop reason: SDUPTHER

## 2018-04-20 NOTE — TELEPHONE ENCOUNTER
Was the patient seen in the last year in this department? Yes (2/1/2018) pharmacy left a mess asking if pt could get enough to last until her mirtha next week.4/24/2018  last filled 1/31/2018 #180 tabs.  Does patient have an active prescription for medications requested? No   Received Request Via: Pharmacy

## 2018-04-24 ENCOUNTER — OFFICE VISIT (OUTPATIENT)
Dept: MEDICAL GROUP | Facility: MEDICAL CENTER | Age: 53
End: 2018-04-24
Payer: COMMERCIAL

## 2018-04-24 VITALS
RESPIRATION RATE: 16 BRPM | OXYGEN SATURATION: 95 % | SYSTOLIC BLOOD PRESSURE: 138 MMHG | TEMPERATURE: 97.3 F | HEIGHT: 65 IN | WEIGHT: 293 LBS | DIASTOLIC BLOOD PRESSURE: 92 MMHG | HEART RATE: 84 BPM | BODY MASS INDEX: 48.82 KG/M2

## 2018-04-24 DIAGNOSIS — M79.7 FIBROMYALGIA: ICD-10-CM

## 2018-04-24 DIAGNOSIS — K21.9 GASTROESOPHAGEAL REFLUX DISEASE WITHOUT ESOPHAGITIS: ICD-10-CM

## 2018-04-24 DIAGNOSIS — E03.9 ACQUIRED HYPOTHYROIDISM: ICD-10-CM

## 2018-04-24 DIAGNOSIS — Z79.891 CHRONIC USE OF OPIATE DRUGS THERAPEUTIC PURPOSES: ICD-10-CM

## 2018-04-24 DIAGNOSIS — M47.812 SPONDYLOSIS OF CERVICAL REGION WITHOUT MYELOPATHY OR RADICULOPATHY: ICD-10-CM

## 2018-04-24 DIAGNOSIS — M51.36 DDD (DEGENERATIVE DISC DISEASE), LUMBAR: ICD-10-CM

## 2018-04-24 PROCEDURE — 99214 OFFICE O/P EST MOD 30 MIN: CPT | Performed by: INTERNAL MEDICINE

## 2018-04-24 RX ORDER — HYDROCODONE BITARTRATE AND ACETAMINOPHEN 10; 325 MG/1; MG/1
1 TABLET ORAL EVERY 6 HOURS
COMMUNITY
End: 2018-04-24 | Stop reason: SDUPTHER

## 2018-04-24 RX ORDER — HYDROCODONE BITARTRATE AND ACETAMINOPHEN 10; 325 MG/1; MG/1
1 TABLET ORAL EVERY 6 HOURS PRN
Qty: 120 TAB | Refills: 0 | Status: SHIPPED | OUTPATIENT
Start: 2018-04-30 | End: 2018-05-30

## 2018-04-24 RX ORDER — IBUPROFEN 800 MG/1
800 TABLET ORAL EVERY 8 HOURS PRN
Qty: 270 TAB | Refills: 1 | Status: SHIPPED | OUTPATIENT
Start: 2018-04-24 | End: 2018-11-11 | Stop reason: SDUPTHER

## 2018-04-24 RX ORDER — RANITIDINE 150 MG/1
CAPSULE ORAL
Qty: 180 CAP | Refills: 1 | Status: SHIPPED | OUTPATIENT
Start: 2018-04-24 | End: 2018-11-11 | Stop reason: SDUPTHER

## 2018-04-24 RX ORDER — PREGABALIN 150 MG/1
CAPSULE ORAL
Qty: 180 CAP | Refills: 0 | Status: SHIPPED
Start: 2018-04-24 | End: 2018-04-26

## 2018-04-25 ENCOUNTER — TELEPHONE (OUTPATIENT)
Dept: MEDICAL GROUP | Facility: MEDICAL CENTER | Age: 53
End: 2018-04-25

## 2018-04-25 DIAGNOSIS — M79.7 FIBROMYALGIA: ICD-10-CM

## 2018-04-25 NOTE — TELEPHONE ENCOUNTER
Dr. Hui  Pt was seen 4/24/18 and your rx'd:  pregabalin (LYRICA) 150 MG Cap 180 Cap 0/0 4/24/2018 5/24/2018    Sig: One in am and 2 in the pm for 30 days    Class: Print Plain Paper    Notes to Pharmacy: 1 in the AM, 2 in the PM      The pharmacy needs to clarify the quantity vs the days supply you wrote. Please advise... Or send a new rx with the correct quantity for a 30 day supply.

## 2018-04-25 NOTE — PROGRESS NOTES
Chief Complaint   Patient presents with   • Pain     refill zantac,ibuprofen,norco,lyrica       HISTORY OF PRESENT ILLNESS: Patient is a 52 y.o. female patient who presents today to discuss the evaluation and management of:          1. Spondylosis of cervical region without myelopathy or radiculopathy    Patient with chronic pain in neck, she recently changed her pain medicine specialist as Dr. Loya left [facility]. She states her new pain specialist is willing to take over prescribing her medications for her, and is considering changing her over to morphine however she would like to wait and do this until after she has a proposed nerve ablation done in early May. She currently is having a pain flare now as she did some housework yesterday. She states her pain is in her neck, back, arms, and basically all around her trunk.    2. DDD (degenerative disc disease), lumbar    See above    3. Chronic use of opiate drugs therapeutic purposes    Patient takes Norco 10/325 every 6 hours 3-4 pills a day.  Chronic pain recheck:   Last dose of controlled substance: today  Chronic pain treated with norco taken 3-4 times a day    She  reports that she does not drink alcohol.  She  reports that she does not use drugs.    Consequences of Chronic Opiate therapy:  (5 A's)  Analgesia: Compared to no treatment or prior treatment, pain is currently improved  Activity: not changed  Adverse Events: She denies constipation, dry mouth, itchy skin, nausea and sedation  Aberrant Behaviors: She reports she is taking medication as prescribed and is not veering from agreed treatment regimen. There have been no inappropriate refills or lost/stolen meds reported.   Affect/Mood: Pain is not impacting patient's mood.  Patient denies depression/anxiety.    Nonnarcotic treatments that are being used: Lyrica,  NSAIDS,  Treatment goals discussed.    Opioid Risk Score: 3    Interpretation of Opioid Risk Score   Score 0-3 = Low risk of abuse. Do UDS at least  once per year.  Score 4-7 = Moderate risk of abuse. Do UDS 1-4 times per year.  Score 8+ = High risk of abuse. Refer to specialist.    Last order of CONTROLLED SUBSTANCE TREATMENT AGREEMENT was found on 4/24/2018 from Office Visit on 4/24/2018     UDS Summary                URINE DRUG SCREEN Next Due 12/23/2018      Done 12/28/2017 [facility] PAIN MANAGEMENT SCREEN     Patient has more history with this topic...        Most recent UDS reviewed today and is consistent with prescribed medications.    I have reviewed the medical records, the Prescription Monitoring Program and I have determined that controlled substance treatment is medically indicated.    Note:  not available today for unclear reasons website not working  4. Fibromyalgia    Patient takes Lyrica 150 mg in the morning and 300 mg at at bedtime.    5. Acquired hypothyroidism    Currently on Synthroid 200 µg daily. She has not had her lab work done since July 2016. She admits to being noncompliant with her medication, and that is why her TSH has been elevated in the past.    6. Gastroesophageal reflux disease without esophagitis    Patient has quite severe reflux, she takes omeprazole daily as well as Zantac 300 mg daily.        Patient Active Problem List    Diagnosis Date Noted   • Morbid obesity with BMI of 50.0-59.9, adult ([facility]) 02/01/2018   • DDD (degenerative disc disease), lumbar 09/26/2017   • Chronic use of opiate drugs therapeutic purposes 02/17/2017   • Acquired hypothyroidism 12/18/2015   • Spondylosis of cervical region without myelopathy or radiculopathy 08/28/2015   • Other migraine without status migrainosus, not intractable 08/28/2015   • Fibromyalgia 04/04/2013   • DJD (degenerative joint disease), cervical 04/04/2013   • ENDOMETRIOSIS 09/13/2011   • PCOS (polycystic ovarian syndrome) 02/01/2011   • Anxiety disorder 02/01/2011   • GERD (gastroesophageal reflux disease) 02/01/2011        Allergies:Latex    Current meds including changes  today  Current Outpatient Prescriptions   Medication Sig Dispense Refill   • pregabalin (LYRICA) 150 MG Cap One in am and 2 in the pm for 30 days 180 Cap 0   • [START ON 4/30/2018] HYDROcodone/acetaminophen (NORCO)  MG Tab Take 1 Tab by mouth every 6 hours as needed for Moderate Pain for up to 30 days. 120 Tab 0   • ranitidine (ZANTAC) 150 MG capsule Take 2 caps a day 180 Cap 1   • ibuprofen (MOTRIN) 800 MG Tab Take 1 Tab by mouth every 8 hours as needed. 270 Tab 1   • sertraline (ZOLOFT) 100 MG Tab TAKE TWO TABLETS BY MOUTH DAILY 180 Tab 1   • traZODone (DESYREL) 50 MG Tab TAKE ONE TABLET BY MOUTH AT BEDTIME 90 Tab 1   • omeprazole (PRILOSEC) 20 MG delayed-release capsule TAKE ONE CAPSULE BY MOUTH DAILY 90 Cap 0   • topiramate (TOPAMAX) 50 MG tablet TAKE ONE TABLET BY MOUTH DAILY 90 Tab 1   • cyclobenzaprine (FLEXERIL) 10 MG Tab TAKE ONE TABLET BY MOUTH THREE TIMES A DAY AS NEEDED FOR MUSCLE SPASMS 270 Tab 1   • levothyroxine (SYNTHROID) 200 MCG Tab TAKE ONE TABLET BY MOUTH EVERY MORNING ON AN EMPTY STOMACH 90 Tab 0   • levothyroxine (SYNTHROID) 25 MCG Tab TAKE ONE TABLET BY MOUTH DAILY WITH A 200 MCG TABLET 90 Tab 3   • POTASSIUM CHLORIDE by Does not apply route.     • BIOTIN by Does not apply route.       No current facility-administered medications for this visit.      Social History   Substance Use Topics   • Smoking status: Never Smoker   • Smokeless tobacco: Never Used      Comment: avoid all tobacco products   • Alcohol use No      Comment: occasionally     Social History     Social History Narrative   • No narrative on file       Family History   Problem Relation Age of Onset   • Hyperlipidemia Mother    • Diabetes Sister    • Hypertension Sister    • Hyperlipidemia Sister    • Diabetes Brother    • Hypertension Brother    • Hyperlipidemia Brother    • Heart Disease Maternal Aunt    • Heart Disease Maternal Grandmother    • Diabetes Paternal Grandmother    • Cancer Neg Hx    • Stroke Neg Hx   "      Review of Systems:  No chest pain, No shortness of breath, No dyspnea on exertion  Gastrointestinal ROS: No abdominal pain, No nausea, vomiting, diarrhea, or constipation        Exam:      Blood pressure 138/92, pulse 84, temperature 36.3 °C (97.3 °F), resp. rate 16, height 1.651 m (5' 5\"), weight (!) 137.8 kg (303 lb 12.7 oz), SpO2 95 %, not currently breastfeeding.  General:  Well nourished, well developed female in NAD affect and mood within normal limits  Head is grossly normal.  Neck: Supple without adenopathy  Pulmonary: Clear to ausculation.  Normal effort. No rales, rhonchi, or wheezing.  Cardiovascular: Regular rate and rhythm without murmur.   Extremities: no clubbing, cyanosis, or edema.  Neuro: moves all extremities symmetrically    Please note that this dictation was created using voice recognition software. I have made every reasonable attempt to correct obvious errors, but I expect that there are errors of grammar and possibly content that I did not discover before finalizing the note.    Assessment/Plan:  1. Spondylosis of cervical region without myelopathy or radiculopathy    I told patient I would refill her Norco for her for one month, and following this she is going to be getting it through her new pain management specialist.  - HYDROcodone/acetaminophen (NORCO)  MG Tab; Take 1 Tab by mouth every 6 hours as needed for Moderate Pain for up to 30 days.  Dispense: 120 Tab; Refill: 0  - CONSENT FOR OPIATE PRESCRIPTION    2. DDD (degenerative disc disease), lumbar      - HYDROcodone/acetaminophen (NORCO)  MG Tab; Take 1 Tab by mouth every 6 hours as needed for Moderate Pain for up to 30 days.  Dispense: 120 Tab; Refill: 0  - CONSENT FOR OPIATE PRESCRIPTION    3. Chronic use of opiate drugs therapeutic purposes      - HYDROcodone/acetaminophen (NORCO)  MG Tab; Take 1 Tab by mouth every 6 hours as needed for Moderate Pain for up to 30 days.  Dispense: 120 Tab; Refill: 0  - " CONSENT FOR OPIATE PRESCRIPTION  - Controlled Substance Treatment Agreement    4. Fibromyalgia      - pregabalin (LYRICA) 150 MG Cap; One in am and 2 in the pm for 30 days  Dispense: 90 Cap; Refill: 2    5. Acquired hypothyroidism    Continue current dose of Synthroid, patient encouraged to be compliant with her medication    6. Gastroesophageal reflux disease without esophagitis      - ranitidine (ZANTAC) 150 MG capsule; Take 2 caps a day  Dispense: 180 Cap; Refill: 1    Total 35 minutes face-to-face time spent with patient, with greater than 50% of the total time discussing patient's issues and symptoms as listed above in assessment and plan, as well as managing coordination of care for future evaluation and treatment.  Followup: Return in about 3 months (around 7/24/2018).

## 2018-04-26 RX ORDER — PREGABALIN 150 MG/1
CAPSULE ORAL
Qty: 90 CAP | Refills: 2 | Status: SHIPPED
Start: 2018-04-26 | End: 2018-05-26

## 2018-04-28 RX ORDER — OMEPRAZOLE 20 MG/1
20 CAPSULE, DELAYED RELEASE ORAL DAILY
Qty: 90 CAP | Refills: 2 | Status: SHIPPED | OUTPATIENT
Start: 2018-04-28 | End: 2019-02-08 | Stop reason: SDUPTHER

## 2018-05-14 RX ORDER — LEVOTHYROXINE SODIUM 0.2 MG/1
TABLET ORAL
Qty: 30 TAB | Refills: 0 | Status: SHIPPED | OUTPATIENT
Start: 2018-05-14 | End: 2018-06-23 | Stop reason: SDUPTHER

## 2018-05-29 DIAGNOSIS — M47.896 OTHER OSTEOARTHRITIS OF SPINE, LUMBAR REGION: ICD-10-CM

## 2018-05-29 RX ORDER — CYCLOBENZAPRINE HCL 10 MG
TABLET ORAL
Qty: 270 TAB | Refills: 0 | Status: SHIPPED | OUTPATIENT
Start: 2018-05-29 | End: 2018-09-03 | Stop reason: SDUPTHER

## 2018-05-29 NOTE — TELEPHONE ENCOUNTER
Was the patient seen in the last year in this department? Yes     Does patient have an active prescription for medications requested? No     Received Request Via: Pharmacy     Future Appointments       Provider Department Center    7/19/2018 2:00 PM Bertha Hui M.D. [facility] [place]

## 2018-06-14 RX ORDER — TOPIRAMATE 50 MG/1
50 TABLET, FILM COATED ORAL DAILY
Qty: 90 TAB | Refills: 1 | Status: SHIPPED | OUTPATIENT
Start: 2018-06-14 | End: 2018-12-13 | Stop reason: SDUPTHER

## 2018-06-26 RX ORDER — LEVOTHYROXINE SODIUM 0.2 MG/1
TABLET ORAL
Qty: 30 TAB | Refills: 3 | Status: SHIPPED | OUTPATIENT
Start: 2018-06-26 | End: 2018-11-13 | Stop reason: SDUPTHER

## 2018-07-19 ENCOUNTER — OFFICE VISIT (OUTPATIENT)
Dept: MEDICAL GROUP | Facility: MEDICAL CENTER | Age: 53
End: 2018-07-19
Payer: COMMERCIAL

## 2018-07-19 ENCOUNTER — HOSPITAL ENCOUNTER (OUTPATIENT)
Facility: MEDICAL CENTER | Age: 53
End: 2018-07-19
Attending: INTERNAL MEDICINE
Payer: COMMERCIAL

## 2018-07-19 VITALS
DIASTOLIC BLOOD PRESSURE: 84 MMHG | HEART RATE: 92 BPM | WEIGHT: 293 LBS | OXYGEN SATURATION: 94 % | TEMPERATURE: 97.8 F | HEIGHT: 65 IN | SYSTOLIC BLOOD PRESSURE: 144 MMHG | BODY MASS INDEX: 48.82 KG/M2 | RESPIRATION RATE: 16 BRPM

## 2018-07-19 DIAGNOSIS — M51.36 DDD (DEGENERATIVE DISC DISEASE), LUMBAR: ICD-10-CM

## 2018-07-19 DIAGNOSIS — M79.7 FIBROMYALGIA: ICD-10-CM

## 2018-07-19 DIAGNOSIS — Z79.891 CHRONIC USE OF OPIATE DRUGS THERAPEUTIC PURPOSES: ICD-10-CM

## 2018-07-19 DIAGNOSIS — F41.9 ANXIETY: ICD-10-CM

## 2018-07-19 PROCEDURE — 99214 OFFICE O/P EST MOD 30 MIN: CPT | Performed by: INTERNAL MEDICINE

## 2018-07-19 PROCEDURE — 80307 DRUG TEST PRSMV CHEM ANLYZR: CPT

## 2018-07-19 RX ORDER — HYDROCODONE BITARTRATE AND ACETAMINOPHEN 10; 325 MG/1; MG/1
1 TABLET ORAL EVERY 6 HOURS PRN
Qty: 120 TAB | Refills: 0 | Status: SHIPPED | OUTPATIENT
Start: 2018-07-19 | End: 2018-09-06 | Stop reason: SDUPTHER

## 2018-07-19 RX ORDER — PREGABALIN 150 MG/1
150 CAPSULE ORAL 3 TIMES DAILY
COMMUNITY
End: 2018-09-16 | Stop reason: SDUPTHER

## 2018-07-19 RX ORDER — HYDROCODONE BITARTRATE AND ACETAMINOPHEN 10; 325 MG/1; MG/1
1-2 TABLET ORAL
COMMUNITY
End: 2018-07-19 | Stop reason: SDUPTHER

## 2018-07-20 DIAGNOSIS — Z79.891 CHRONIC USE OF OPIATE DRUGS THERAPEUTIC PURPOSES: ICD-10-CM

## 2018-07-20 NOTE — PROGRESS NOTES
Chief Complaint   Patient presents with   • Chronic Opiate Therapy     Chronic pain, 3 month follow-up      HISTORY OF PRESENT ILLNESS: Patient is a 52 y.o. female patient who presents today to discuss the evaluation and management of:          1. DDD (degenerative disc disease), lumbar    Patient saw pain management physician Dr. Mcclain. She was supposed to have a procedure done at her lower lumbar vertebrae a couple of days ago, but she developed palpitations and decided not to have it done. She is going to reschedule. She has been stretching out the Norco prescription that I gave to her 3 months ago, taking it twice a day with reasonable relief of her pain.  She continues to take Lyrica 150 mg 3 times a day.  2. Anxiety    Patient is having a very challenging week. Her son was hospitalized last night with back pain and is going to have an MRI today. Her pain management doctor told her he would try to get her off of all of her medications including Zoloft and trazodone which she did not appreciate.    3. Chronic use of opiate drugs therapeutic purposes    Chronic pain recheck:   Last dose of controlled substance: today  Chronic pain treated with Norco 10/325 taken twice a day    She  reports that she does not drink alcohol.  She  reports that she does not use drugs.    Consequences of Chronic Opiate therapy:  (5 A's)  Analgesia: Compared to no treatment or prior treatment, pain is currently improved  Activity: improved  Adverse Events: She denies side effects  Aberrant Behaviors: She reports she is taking medication as prescribed and is not veering from agreed treatment regimen. There have been no inappropriate refills or lost/stolen meds reported.   Affect/Mood: Pain is impacting patient's mood.  Patient reports depression/anxiety.    Nonnarcotic treatments that are being used: Lyrica.   Treatment goals discussed.    Opioid Risk Score: 3    Interpretation of Opioid Risk Score   Score 0-3 = Low risk of abuse. Do  UDS at least once per year.  Score 4-7 = Moderate risk of abuse. Do UDS 1-4 times per year.  Score 8+ = High risk of abuse. Refer to specialist.    Last order of CONTROLLED SUBSTANCE TREATMENT AGREEMENT was found on 4/24/2018 from Office Visit on 4/24/2018     UDS Summary                URINE DRUG SCREEN Next Due 12/23/2018      Done 12/28/2017 [facility] PAIN MANAGEMENT SCREEN     Patient has more history with this topic...        Most recent UDS reviewed today and is consistent with prescribed medications.    I have reviewed the medical records, the Prescription Monitoring Program and I have determined that controlled substance treatment is medically indicated.  Last filled for 120 tablets April 2018. No other providers    4. Fibromyalgia   See above        Patient Active Problem List    Diagnosis Date Noted   • Anxiety 07/19/2018   • Morbid obesity with BMI of 50.0-59.9, adult ([facility]) 02/01/2018   • DDD (degenerative disc disease), lumbar 09/26/2017   • Chronic use of opiate drugs therapeutic purposes 02/17/2017   • Acquired hypothyroidism 12/18/2015   • Spondylosis of cervical region without myelopathy or radiculopathy 08/28/2015   • Other migraine without status migrainosus, not intractable 08/28/2015   • Fibromyalgia 04/04/2013   • DJD (degenerative joint disease), cervical 04/04/2013   • ENDOMETRIOSIS 09/13/2011   • PCOS (polycystic ovarian syndrome) 02/01/2011   • GERD (gastroesophageal reflux disease) 02/01/2011        Allergies:Latex    Current meds including changes today  Current Outpatient Prescriptions   Medication Sig Dispense Refill   • HYDROcodone/acetaminophen (NORCO)  MG Tab Take 1 Tab by mouth every 6 hours as needed for up to 30 days. 120 Tab 0   • pregabalin (LYRICA) 150 MG Cap Take 150 mg by mouth 3 times a day.     • levothyroxine (SYNTHROID) 200 MCG Tab TAKE ONE TABLET BY MOUTH EVERY MORNING ON AN EMPTY STOMACH 30 Tab 3   • topiramate (TOPAMAX) 50 MG tablet Take 1 Tab by mouth every day.  "90 Tab 1   • cyclobenzaprine (FLEXERIL) 10 MG Tab TAKE ONE TABLET BY MOUTH THREE TIMES A DAY AS NEEDED FOR MUSCLE SPASMS 270 Tab 0   • omeprazole (PRILOSEC) 20 MG delayed-release capsule Take 1 Cap by mouth every day. 90 Cap 2   • ranitidine (ZANTAC) 150 MG capsule Take 2 caps a day 180 Cap 1   • ibuprofen (MOTRIN) 800 MG Tab Take 1 Tab by mouth every 8 hours as needed. 270 Tab 1   • sertraline (ZOLOFT) 100 MG Tab TAKE TWO TABLETS BY MOUTH DAILY 180 Tab 1   • traZODone (DESYREL) 50 MG Tab TAKE ONE TABLET BY MOUTH AT BEDTIME 90 Tab 1   • POTASSIUM CHLORIDE by Does not apply route.     • BIOTIN by Does not apply route.       No current facility-administered medications for this visit.      Social History   Substance Use Topics   • Smoking status: Never Smoker   • Smokeless tobacco: Never Used      Comment: avoid all tobacco products   • Alcohol use No      Comment: occasionally     Social History     Social History Narrative   • No narrative on file       Family History   Problem Relation Age of Onset   • Hyperlipidemia Mother    • Diabetes Sister    • Hypertension Sister    • Hyperlipidemia Sister    • Diabetes Brother    • Hypertension Brother    • Hyperlipidemia Brother    • Heart Disease Maternal Aunt    • Heart Disease Maternal Grandmother    • Diabetes Paternal Grandmother    • Cancer Neg Hx    • Stroke Neg Hx        Review of Systems:  No chest pain, No shortness of breath, No dyspnea on exertion  Gastrointestinal ROS: No abdominal pain, No nausea, vomiting, diarrhea, or constipation        Exam:      Blood pressure 144/84, pulse 92, temperature 36.6 °C (97.8 °F), resp. rate 16, height 1.651 m (5' 5\"), weight (!) 138 kg (304 lb 3.8 oz), SpO2 94 %, not currently breastfeeding.  General:  Obese female in NAD affect and mood within normal limits  Head is grossly normal.  Neck: Supple without adenopathy  Pulmonary: Clear to ausculation.  Normal effort. No rales, rhonchi, or wheezing.  Cardiovascular: Regular rate " and rhythm without murmur.   Extremities: no clubbing, cyanosis, or edema.  Neuro: moves all extremities symmetrically    Please note that this dictation was created using voice recognition software. I have made every reasonable attempt to correct obvious errors, but I expect that there are errors of grammar and possibly content that I did not discover before finalizing the note.    Assessment/Plan:  1. DDD (degenerative disc disease), lumbar      - HYDROcodone/acetaminophen (NORCO)  MG Tab; Take 1 Tab by mouth every 6 hours as needed for up to 30 days.  Dispense: 120 Tab; Refill: 0    2. Anxiety    Stable, continue Zoloft and trazodone at bedtime    3. Chronic use of opiate drugs therapeutic purposes-  Patient may have another refill if she needs prior to seeing me in 3 months, she will need to come into the office to pick it up.    - HYDROcodone/acetaminophen (NORCO)  MG Tab; Take 1 Tab by mouth every 6 hours as needed for up to 30 days.  Dispense: 120 Tab; Refill: 0  - URINE DRUG SCREEN; Future    4. Fibromyalgia      - HYDROcodone/acetaminophen (NORCO)  MG Tab; Take 1 Tab by mouth every 6 hours as needed for up to 30 days.  Dispense: 120 Tab; Refill: 0    Followup: Return in about 3 months (around 10/19/2018).

## 2018-07-22 LAB
AMPHETAMINES UR QL: NEGATIVE NG/ML
BARBITURATES UR QL: NEGATIVE NG/ML
BENZODIAZ UR QL: NEGATIVE NG/ML
CANNABINOIDS UR QL SCN: NEGATIVE NG/ML
COCAINE UR QL: NEGATIVE NG/ML
DRUG SCREEN COMMENT UR-IMP: NORMAL
MDMA CTO UR SCN-MCNC: NEGATIVE NG/ML
METHADONE UR QL: NEGATIVE NG/ML
OPIATES UR QL: POSITIVE NG/ML
OXYCODONE CTO UR SCN-MCNC: NEGATIVE NG/ML
PCP UR QL SCN: NEGATIVE NG/ML
PROPOXYPH UR QL: NEGATIVE NG/ML

## 2018-09-03 DIAGNOSIS — M47.896 OTHER OSTEOARTHRITIS OF SPINE, LUMBAR REGION: ICD-10-CM

## 2018-09-04 RX ORDER — CYCLOBENZAPRINE HCL 10 MG
TABLET ORAL
Qty: 270 TAB | Refills: 0 | Status: SHIPPED | OUTPATIENT
Start: 2018-09-04 | End: 2018-12-01 | Stop reason: SDUPTHER

## 2018-09-06 DIAGNOSIS — M51.36 DDD (DEGENERATIVE DISC DISEASE), LUMBAR: ICD-10-CM

## 2018-09-06 DIAGNOSIS — Z79.891 CHRONIC USE OF OPIATE DRUGS THERAPEUTIC PURPOSES: ICD-10-CM

## 2018-09-06 DIAGNOSIS — M79.7 FIBROMYALGIA: ICD-10-CM

## 2018-09-06 RX ORDER — HYDROCODONE BITARTRATE AND ACETAMINOPHEN 10; 325 MG/1; MG/1
1 TABLET ORAL EVERY 6 HOURS PRN
Qty: 120 TAB | Refills: 0 | Status: SHIPPED | OUTPATIENT
Start: 2018-09-06 | End: 2018-10-06

## 2018-09-13 ENCOUNTER — TELEPHONE (OUTPATIENT)
Dept: MEDICAL GROUP | Facility: PHYSICIAN GROUP | Age: 53
End: 2018-09-13

## 2018-10-18 ENCOUNTER — APPOINTMENT (OUTPATIENT)
Dept: MEDICAL GROUP | Facility: MEDICAL CENTER | Age: 53
End: 2018-10-18
Payer: COMMERCIAL

## 2018-10-23 ENCOUNTER — OFFICE VISIT (OUTPATIENT)
Dept: MEDICAL GROUP | Facility: MEDICAL CENTER | Age: 53
End: 2018-10-23
Payer: COMMERCIAL

## 2018-10-23 ENCOUNTER — HOSPITAL ENCOUNTER (OUTPATIENT)
Dept: LAB | Facility: MEDICAL CENTER | Age: 53
End: 2018-10-23
Attending: FAMILY MEDICINE
Payer: COMMERCIAL

## 2018-10-23 VITALS
HEIGHT: 65 IN | RESPIRATION RATE: 16 BRPM | HEART RATE: 76 BPM | TEMPERATURE: 97.8 F | SYSTOLIC BLOOD PRESSURE: 136 MMHG | WEIGHT: 293 LBS | BODY MASS INDEX: 48.82 KG/M2 | DIASTOLIC BLOOD PRESSURE: 84 MMHG | OXYGEN SATURATION: 93 %

## 2018-10-23 DIAGNOSIS — M47.812 DJD (DEGENERATIVE JOINT DISEASE), CERVICAL: ICD-10-CM

## 2018-10-23 DIAGNOSIS — Z13.6 SCREENING FOR CARDIOVASCULAR CONDITION: ICD-10-CM

## 2018-10-23 DIAGNOSIS — M51.36 DDD (DEGENERATIVE DISC DISEASE), LUMBAR: ICD-10-CM

## 2018-10-23 DIAGNOSIS — Z79.891 CHRONIC USE OF OPIATE DRUGS THERAPEUTIC PURPOSES: ICD-10-CM

## 2018-10-23 DIAGNOSIS — E03.8 OTHER SPECIFIED HYPOTHYROIDISM: ICD-10-CM

## 2018-10-23 DIAGNOSIS — M47.22 OSTEOARTHRITIS OF SPINE WITH RADICULOPATHY, CERVICAL REGION: ICD-10-CM

## 2018-10-23 DIAGNOSIS — E66.01 MORBID OBESITY WITH BMI OF 50.0-59.9, ADULT (HCC): ICD-10-CM

## 2018-10-23 DIAGNOSIS — M47.812 SPONDYLOSIS OF CERVICAL REGION WITHOUT MYELOPATHY OR RADICULOPATHY: ICD-10-CM

## 2018-10-23 DIAGNOSIS — E03.9 ACQUIRED HYPOTHYROIDISM: ICD-10-CM

## 2018-10-23 DIAGNOSIS — G56.03 BILATERAL CARPAL TUNNEL SYNDROME: ICD-10-CM

## 2018-10-23 DIAGNOSIS — Z23 NEED FOR VACCINATION: ICD-10-CM

## 2018-10-23 LAB
BASOPHILS # BLD AUTO: 0.7 % (ref 0–1.8)
BASOPHILS # BLD: 0.06 K/UL (ref 0–0.12)
EOSINOPHIL # BLD AUTO: 0.28 K/UL (ref 0–0.51)
EOSINOPHIL NFR BLD: 3.1 % (ref 0–6.9)
ERYTHROCYTE [DISTWIDTH] IN BLOOD BY AUTOMATED COUNT: 43.2 FL (ref 35.9–50)
HCT VFR BLD AUTO: 44.8 % (ref 37–47)
HGB BLD-MCNC: 14.8 G/DL (ref 12–16)
IMM GRANULOCYTES # BLD AUTO: 0.03 K/UL (ref 0–0.11)
IMM GRANULOCYTES NFR BLD AUTO: 0.3 % (ref 0–0.9)
LYMPHOCYTES # BLD AUTO: 3.24 K/UL (ref 1–4.8)
LYMPHOCYTES NFR BLD: 36.4 % (ref 22–41)
MCH RBC QN AUTO: 30 PG (ref 27–33)
MCHC RBC AUTO-ENTMCNC: 33 G/DL (ref 33.6–35)
MCV RBC AUTO: 90.7 FL (ref 81.4–97.8)
MONOCYTES # BLD AUTO: 0.42 K/UL (ref 0–0.85)
MONOCYTES NFR BLD AUTO: 4.7 % (ref 0–13.4)
NEUTROPHILS # BLD AUTO: 4.86 K/UL (ref 2–7.15)
NEUTROPHILS NFR BLD: 54.8 % (ref 44–72)
NRBC # BLD AUTO: 0 K/UL
NRBC BLD-RTO: 0 /100 WBC
PLATELET # BLD AUTO: 213 K/UL (ref 164–446)
PMV BLD AUTO: 11.5 FL (ref 9–12.9)
RBC # BLD AUTO: 4.94 M/UL (ref 4.2–5.4)
T4 FREE SERPL-MCNC: 1.65 NG/DL (ref 0.53–1.43)
TSH SERPL DL<=0.005 MIU/L-ACNC: 15.67 UIU/ML (ref 0.38–5.33)
WBC # BLD AUTO: 8.9 K/UL (ref 4.8–10.8)

## 2018-10-23 PROCEDURE — 85025 COMPLETE CBC W/AUTO DIFF WBC: CPT

## 2018-10-23 PROCEDURE — 84439 ASSAY OF FREE THYROXINE: CPT

## 2018-10-23 PROCEDURE — 80053 COMPREHEN METABOLIC PANEL: CPT

## 2018-10-23 PROCEDURE — 36415 COLL VENOUS BLD VENIPUNCTURE: CPT

## 2018-10-23 PROCEDURE — 80061 LIPID PANEL: CPT

## 2018-10-23 PROCEDURE — 84443 ASSAY THYROID STIM HORMONE: CPT

## 2018-10-23 PROCEDURE — 90686 IIV4 VACC NO PRSV 0.5 ML IM: CPT | Performed by: INTERNAL MEDICINE

## 2018-10-23 PROCEDURE — 99214 OFFICE O/P EST MOD 30 MIN: CPT | Mod: 25 | Performed by: INTERNAL MEDICINE

## 2018-10-23 PROCEDURE — 90471 IMMUNIZATION ADMIN: CPT | Performed by: INTERNAL MEDICINE

## 2018-10-23 RX ORDER — HYDROCODONE BITARTRATE AND ACETAMINOPHEN 10; 325 MG/1; MG/1
1 TABLET ORAL 3 TIMES DAILY
Qty: 90 TAB | Refills: 0 | Status: SHIPPED | OUTPATIENT
Start: 2018-11-22 | End: 2018-10-23

## 2018-10-23 RX ORDER — HYDROCODONE BITARTRATE AND ACETAMINOPHEN 10; 325 MG/1; MG/1
1 TABLET ORAL 3 TIMES DAILY
Qty: 90 TAB | Refills: 0 | Status: SHIPPED | OUTPATIENT
Start: 2018-10-23 | End: 2018-10-23 | Stop reason: SDUPTHER

## 2018-10-23 RX ORDER — HYDROCODONE BITARTRATE AND ACETAMINOPHEN 10; 325 MG/1; MG/1
1 TABLET ORAL 3 TIMES DAILY
Qty: 90 TAB | Refills: 0 | Status: SHIPPED | OUTPATIENT
Start: 2018-12-22 | End: 2019-01-21

## 2018-10-23 RX ORDER — HYDROCODONE BITARTRATE AND ACETAMINOPHEN 10; 325 MG/1; MG/1
1 TABLET ORAL 3 TIMES DAILY
COMMUNITY
End: 2018-10-23 | Stop reason: SDUPTHER

## 2018-10-23 NOTE — PROGRESS NOTES
Chief Complaint   Patient presents with   • Chronic Opiate Therapy     3 month follow up     Chief complaint: Chronic pain    HISTORY OF PRESENT ILLNESS: Patient is a 53 y.o. female patient who presents today to discuss the evaluation and management of:          1. Need for vaccination    Due for flu shot    2. Bilateral carpal tunnel syndrome    Patient is requesting a prescription for bilateral wrist splints. Hers are completely worn out.    3. Chronic use of opiate drugs therapeutic purposes    Chronic pain recheck for:  Last dose of controlled substance: Today  Chronic pain treated with  taken 2-3 times a day    She  reports that she does not drink alcohol.  She  reports that she does not use drugs.    Interval history:   Any major change in health since last appointment? No  Patient had a minor flare in her pain after helping her  move a heavy stove yesterday.    She had her disability form filled out recently by her pain management physician, she is going to bring a copy for me    Consequences of Chronic Opiate therapy:  (5 A's)  Analgesia: Compared to no treatment or prior treatment, pain is currently significantly improved  Activity: not changed  Adverse Events:CAPHE@ denies constipation, dry mouth, itchy skin, nausea and sedation  Aberrant Behaviors: She reports she is taking medication as prescribed and is not veering from agreed treatment regimen or provider recommendations. There have been no inappropriate refills or lost/stolen meds reported.  Affect/Mood: Pain is not impacting patient's mood.  Patient denies depression/anxiety.    Nonnarcotic treatments that are being used: NSAIDs/DILLARD-2.     Last imaging:     Opioid Risk Score: 3    Interpretation of Opioid Risk Score   Score 0-3 = Low risk of abuse. Do UDS at least once per year.  Score 4-7 = Moderate risk of abuse. Do UDS 1-4 times per year.  Score 8+ = High risk of abuse. Refer to specialist.    Last order of CONTROLLED SUBSTANCE TREATMENT  AGREEMENT was found on 4/24/2018 from Office Visit on 4/24/2018     UDS Summary                URINE DRUG SCREEN Next Due 12/23/2018      Done 12/28/2017 [facility] PAIN MANAGEMENT SCREEN     Patient has more history with this topic...        Most recent UDS reviewed today and is consistent with prescribed medications.     I have reviewed the medical records, the Prescription Monitoring Program and I have determined that controlled substance treatment is medically indicated.     4. Osteoarthritis of spine with radiculopathy, cervical region    Patient has had 3 different surgeries on her cervical spine, she continues to have pain. She will continue to be followed with her neurosurgeon. She also is taking ibuprofen 800 mg several times a day and Lyrica 150 mg.      5. Acquired hypothyroidism    Last TSH was in July 2016, patient has been on Synthroid 200 µg daily regularly for the last 2 months. She admits to noncompliance in the past, which could explain why her dose has been escalated.    6. Morbid obesity with BMI of 50.0-59.9, adult ([facility])            Patient Active Problem List    Diagnosis Date Noted   • Bilateral carpal tunnel syndrome 10/23/2018   • Anxiety 07/19/2018   • Morbid obesity with BMI of 50.0-59.9, adult ([facility]) 02/01/2018   • DDD (degenerative disc disease), lumbar 09/26/2017   • Chronic use of opiate drugs therapeutic purposes 02/17/2017   • Acquired hypothyroidism 12/18/2015   • Spondylosis of cervical region without myelopathy or radiculopathy 08/28/2015   • Other migraine without status migrainosus, not intractable 08/28/2015   • Fibromyalgia 04/04/2013   • DJD (degenerative joint disease), cervical 04/04/2013   • ENDOMETRIOSIS 09/13/2011   • PCOS (polycystic ovarian syndrome) 02/01/2011   • GERD (gastroesophageal reflux disease) 02/01/2011        Allergies:Latex    Current meds including changes today  Current Outpatient Prescriptions   Medication Sig Dispense Refill   • [START ON 12/22/2018]  HYDROcodone/acetaminophen (NORCO)  MG Tab Take 1 Tab by mouth 3 times a day for 30 days. 90 Tab 0   • LYRICA 150 MG Cap TAKE ONE CAPSULE BY MOUTH EVERY MORNING AND TAKE TWO CAPSULES BY MOUTH EVERY EVENING FOR 30 DAYS 90 Cap 2   • traZODone (DESYREL) 50 MG Tab TAKE ONE TABLET BY MOUTH AT BEDTIME 90 Tab 1   • sertraline (ZOLOFT) 100 MG Tab TAKE TWO TABLETS BY MOUTH DAILY 180 Tab 1   • cyclobenzaprine (FLEXERIL) 10 MG Tab TAKE ONE TABLET BY MOUTH THREE TIMES A DAY AS NEEDED FOR MUSCLE SPASMS 270 Tab 0   • levothyroxine (SYNTHROID) 200 MCG Tab TAKE ONE TABLET BY MOUTH EVERY MORNING ON AN EMPTY STOMACH 30 Tab 3   • topiramate (TOPAMAX) 50 MG tablet Take 1 Tab by mouth every day. 90 Tab 1   • omeprazole (PRILOSEC) 20 MG delayed-release capsule Take 1 Cap by mouth every day. 90 Cap 2   • ranitidine (ZANTAC) 150 MG capsule Take 2 caps a day 180 Cap 1   • ibuprofen (MOTRIN) 800 MG Tab Take 1 Tab by mouth every 8 hours as needed. 270 Tab 1   • POTASSIUM CHLORIDE by Does not apply route.     • BIOTIN by Does not apply route.       No current facility-administered medications for this visit.      Social History   Substance Use Topics   • Smoking status: Never Smoker   • Smokeless tobacco: Never Used      Comment: avoid all tobacco products   • Alcohol use No      Comment: occasionally     Social History     Social History Narrative   • No narrative on file       Family History   Problem Relation Age of Onset   • Hyperlipidemia Mother    • Diabetes Sister    • Hypertension Sister    • Hyperlipidemia Sister    • Diabetes Brother    • Hypertension Brother    • Hyperlipidemia Brother    • Heart Disease Maternal Aunt    • Heart Disease Maternal Grandmother    • Diabetes Paternal Grandmother    • Cancer Neg Hx    • Stroke Neg Hx        Review of Systems:  No chest pain, No shortness of breath, No dyspnea on exertion  Gastrointestinal ROS: No abdominal pain, No nausea, vomiting, diarrhea, or constipation        Exam:      Blood  "pressure 136/84, pulse 76, temperature 36.6 °C (97.8 °F), temperature source Temporal, resp. rate 16, height 1.651 m (5' 5\"), weight (!) 139.7 kg (308 lb), SpO2 93 %, not currently breastfeeding.  General: Morbidly obese female in NAD affect and mood within normal limits  Head is grossly normal.  Neck: Supple without adenopathy  Pulmonary: Clear to ausculation.  Normal effort. No rales, rhonchi, or wheezing.  Cardiovascular: Regular rate and rhythm without murmur.   Extremities: no clubbing, cyanosis, or edema.  Neuro: moves all extremities symmetrically    Please note that this dictation was created using voice recognition software. I have made every reasonable attempt to correct obvious errors, but I expect that there are errors of grammar and possibly content that I did not discover before finalizing the note.    Assessment/Plan:  1. Need for vaccination      - Influenza Vaccine Quad Injection >3Y (PF)    2. Bilateral carpal tunnel syndrome    Prescription for wrist splints given to patient she will obtain from pharmacy    3. Chronic use of opiate drugs therapeutic purposes    - was checked, shows appropriate refills. Patient was given 3 separate prescriptions for 90 tablets each.    - HYDROcodone/acetaminophen (NORCO)  MG Tab; Take 1 Tab by mouth 3 times a day for 30 days.  Dispense: 90 Tab; Refill: 0    4. Osteoarthritis of spine with radiculopathy, cervical region      - HYDROcodone/acetaminophen (NORCO)  MG Tab; Take 1 Tab by mouth 3 times a day for 30 days.  Dispense: 90 Tab; Refill: 0    5. Acquired hypothyroidism    Await results of recent TSH. May need adjustment in her Synthroid dose.    6. Morbid obesity with BMI of 50.0-59.9, adult ([facility])    Consider discussing bariatric surgery with patient at future appointment.    Followup: Return in about 3 months (around 1/23/2019).        "

## 2018-10-24 LAB
ALBUMIN SERPL BCP-MCNC: 4.1 G/DL (ref 3.2–4.9)
ALBUMIN/GLOB SERPL: 1.3 G/DL
ALP SERPL-CCNC: 90 U/L (ref 30–99)
ALT SERPL-CCNC: 31 U/L (ref 2–50)
ANION GAP SERPL CALC-SCNC: 10 MMOL/L (ref 0–11.9)
AST SERPL-CCNC: 44 U/L (ref 12–45)
BILIRUB SERPL-MCNC: 0.5 MG/DL (ref 0.1–1.5)
BUN SERPL-MCNC: 13 MG/DL (ref 8–22)
CALCIUM SERPL-MCNC: 9.9 MG/DL (ref 8.5–10.5)
CHLORIDE SERPL-SCNC: 100 MMOL/L (ref 96–112)
CHOLEST SERPL-MCNC: 175 MG/DL (ref 100–199)
CO2 SERPL-SCNC: 26 MMOL/L (ref 20–33)
CREAT SERPL-MCNC: 0.9 MG/DL (ref 0.5–1.4)
FASTING STATUS PATIENT QL REPORTED: NORMAL
GLOBULIN SER CALC-MCNC: 3.2 G/DL (ref 1.9–3.5)
GLUCOSE SERPL-MCNC: 234 MG/DL (ref 65–99)
HDLC SERPL-MCNC: 65 MG/DL
LDLC SERPL CALC-MCNC: 82 MG/DL
POTASSIUM SERPL-SCNC: 4.2 MMOL/L (ref 3.6–5.5)
PROT SERPL-MCNC: 7.3 G/DL (ref 6–8.2)
SODIUM SERPL-SCNC: 136 MMOL/L (ref 135–145)
TRIGL SERPL-MCNC: 139 MG/DL (ref 0–149)

## 2018-10-25 ENCOUNTER — PATIENT MESSAGE (OUTPATIENT)
Dept: MEDICAL GROUP | Facility: MEDICAL CENTER | Age: 53
End: 2018-10-25

## 2018-10-25 DIAGNOSIS — R73.09 ELEVATED RANDOM BLOOD GLUCOSE LEVEL: ICD-10-CM

## 2018-10-26 NOTE — TELEPHONE ENCOUNTER
From: Gail Elias  To: Bertha Hui M.D.  Sent: 10/25/2018 5:58 PM PDT  Subject: Test Result Question    Hi, Doctor.    Well, it is obviously easier for me to go to [facility] than [facility]. So, if you put the test in the system, I can go there and get it done. Is it just another fasting lab?  ----- Message -----  From: Bertha Hui M.D.  Sent: 10/25/2018 5:49 PM PDT  To: Gail Elias  Subject: RE: Test Result Question  Unruly Reynolds,    What we need to do is get an evaluation of what your blood sugar has been over the past 3 months. This can be done by a fingerstick in the office, or by another blood draw. It is called a hemoglobin A1c. Let me know which would be easier for you to do. My concern is that you may have developed diabetes. The medications that you mentioned do not generally cause such a significant elevation (your blood sugar was greater than 200). Regarding your thyroid, I think I will just retest you in about a month. If it continues to be conflicting, we may need to have you see an endocrinologist.    Dr. Hui    ----- Message -----   From: Gail Elias   Sent: 10/25/2018 1:52 PM PDT   To: Bertha Hui M.D.  Subject: Test Result Question    Hi, Dr. Hui.    Regarding your message, yes, I had fasted since the last thing I had eaten the night before. I don't know if it matters, but that last thing, at about 9:30 p.m. was a [employer] Bar.  The TSH - well, as I told you, I've been faithfully taking the 200 mg for at least 2 months now, with few, if any, missed days. It looks that, like the last several times it was tested, for some reason, it jumps really high, then the meds change, and it goes back to normal for a while. When it gets low, the rx goes up, thyroid is find for a while, then gets too high again.     My anxiety level hovers around 90% daily. I can imagine that messes with both the creation of cortisol, etc.    So, what now?      So, what  now?

## 2018-11-11 DIAGNOSIS — K21.9 GASTROESOPHAGEAL REFLUX DISEASE WITHOUT ESOPHAGITIS: ICD-10-CM

## 2018-11-13 RX ORDER — LEVOTHYROXINE SODIUM 0.2 MG/1
TABLET ORAL
Qty: 30 TAB | Refills: 2 | Status: SHIPPED | OUTPATIENT
Start: 2018-11-13 | End: 2019-02-11 | Stop reason: SDUPTHER

## 2018-11-13 RX ORDER — RANITIDINE 150 MG/1
TABLET ORAL
Qty: 180 TAB | Refills: 0 | Status: SHIPPED | OUTPATIENT
Start: 2018-11-13 | End: 2019-02-08 | Stop reason: SDUPTHER

## 2018-11-13 RX ORDER — IBUPROFEN 800 MG/1
TABLET ORAL
Qty: 270 TAB | Refills: 0 | Status: SHIPPED | OUTPATIENT
Start: 2018-11-13 | End: 2019-05-07 | Stop reason: SDUPTHER

## 2018-12-01 DIAGNOSIS — M47.896 OTHER OSTEOARTHRITIS OF SPINE, LUMBAR REGION: ICD-10-CM

## 2018-12-03 RX ORDER — CYCLOBENZAPRINE HCL 10 MG
TABLET ORAL
Qty: 270 TAB | Refills: 1 | Status: SHIPPED | OUTPATIENT
Start: 2018-12-03 | End: 2023-04-10

## 2019-01-01 NOTE — TELEPHONE ENCOUNTER
, I was unable to run a  check.    Was the patient seen in the last year in this department? Yes    Does patient have an active prescription for medications requested? No     Received Request Via: Patient     Future Appointments       Provider Department Center    10/18/2018 4:00 PM Bertha Hui M.D. [facility] [place]             Onset: 12/26/19    Location/description: Cough: URI sx going through family; had a stuffy nose 2 weeks ago which cleared up; now has been coughing for past couple days; voice sounds raspy at times, but clears up with coughing. Denies difficulty breathing including wheezing, retractions or cyanosis.    Associated Symptoms: stuffy nose 2 weeks ago; afebrile    What improves/worsens symptoms: humidifier in home    Symptom specific medications: Zarbee's Cold & Mucus every 4 hours (age appropriate per label)    Input and Output: breast fed; wetting normal amount of diapers (6 or more)    Activity level: sleeping normal 10 hours at night; still happy during the day    Temperature (route and time): 97.6, axillary    Weight:   Wt Readings from Last 1 Encounters:   12/03/19 4.125 kg (3 %, Z= -1.84)*     * Growth percentiles are based on WHO (Girls, 0-2 years) data.        Recent Care: 12.3.18: well baby exam    PLAN:  Home Care Advice provided    Caller agrees to follow recommendations.    Reason for Disposition  • Cough with no complications (all triage questions negative)    Protocols used: COUGH-P-

## 2019-01-22 ENCOUNTER — OFFICE VISIT (OUTPATIENT)
Dept: MEDICAL GROUP | Facility: MEDICAL CENTER | Age: 54
End: 2019-01-22
Payer: COMMERCIAL

## 2019-01-22 ENCOUNTER — HOSPITAL ENCOUNTER (OUTPATIENT)
Facility: MEDICAL CENTER | Age: 54
End: 2019-01-22
Attending: INTERNAL MEDICINE
Payer: COMMERCIAL

## 2019-01-22 VITALS
BODY MASS INDEX: 48.82 KG/M2 | DIASTOLIC BLOOD PRESSURE: 88 MMHG | TEMPERATURE: 97.6 F | OXYGEN SATURATION: 94 % | HEART RATE: 76 BPM | SYSTOLIC BLOOD PRESSURE: 134 MMHG | RESPIRATION RATE: 16 BRPM | WEIGHT: 293 LBS | HEIGHT: 65 IN

## 2019-01-22 DIAGNOSIS — M47.812 SPONDYLOSIS OF CERVICAL REGION WITHOUT MYELOPATHY OR RADICULOPATHY: ICD-10-CM

## 2019-01-22 DIAGNOSIS — Z79.891 CHRONIC USE OF OPIATE DRUGS THERAPEUTIC PURPOSES: ICD-10-CM

## 2019-01-22 DIAGNOSIS — F43.21 GRIEF REACTION: ICD-10-CM

## 2019-01-22 DIAGNOSIS — R73.9 HYPERGLYCEMIA: ICD-10-CM

## 2019-01-22 DIAGNOSIS — E03.9 ACQUIRED HYPOTHYROIDISM: ICD-10-CM

## 2019-01-22 PROBLEM — F43.20 GRIEF REACTION: Status: ACTIVE | Noted: 2019-01-22

## 2019-01-22 PROCEDURE — 99214 OFFICE O/P EST MOD 30 MIN: CPT | Performed by: INTERNAL MEDICINE

## 2019-01-22 PROCEDURE — G0480 DRUG TEST DEF 1-7 CLASSES: HCPCS

## 2019-01-22 PROCEDURE — 80307 DRUG TEST PRSMV CHEM ANLYZR: CPT

## 2019-01-22 PROCEDURE — 99000 SPECIMEN HANDLING OFFICE-LAB: CPT | Performed by: INTERNAL MEDICINE

## 2019-01-22 RX ORDER — HYDROCODONE BITARTRATE AND ACETAMINOPHEN 10; 325 MG/1; MG/1
1 TABLET ORAL 3 TIMES DAILY
COMMUNITY
End: 2019-01-22 | Stop reason: SDUPTHER

## 2019-01-22 RX ORDER — HYDROCODONE BITARTRATE AND ACETAMINOPHEN 10; 325 MG/1; MG/1
1 TABLET ORAL EVERY 8 HOURS PRN
Qty: 90 TAB | Refills: 0 | Status: SHIPPED | OUTPATIENT
Start: 2019-01-22 | End: 2019-01-22 | Stop reason: SDUPTHER

## 2019-01-22 RX ORDER — HYDROCODONE BITARTRATE AND ACETAMINOPHEN 10; 325 MG/1; MG/1
1 TABLET ORAL EVERY 8 HOURS PRN
Qty: 90 TAB | Refills: 0 | Status: SHIPPED | OUTPATIENT
Start: 2019-03-23 | End: 2019-05-07 | Stop reason: SDUPTHER

## 2019-01-22 RX ORDER — HYDROCODONE BITARTRATE AND ACETAMINOPHEN 10; 325 MG/1; MG/1
1 TABLET ORAL EVERY 8 HOURS PRN
Qty: 90 TAB | Refills: 0 | Status: SHIPPED | OUTPATIENT
Start: 2019-02-21 | End: 2019-01-22 | Stop reason: SDUPTHER

## 2019-01-22 ASSESSMENT — PATIENT HEALTH QUESTIONNAIRE - PHQ9
5. POOR APPETITE OR OVEREATING: 2 - MORE THAN HALF THE DAYS
CLINICAL INTERPRETATION OF PHQ2 SCORE: 4
SUM OF ALL RESPONSES TO PHQ QUESTIONS 1-9: 11

## 2019-01-22 NOTE — PROGRESS NOTES
Chief Complaint   Patient presents with   • Chronic Opiate Therapy     3 month follow up     Chief complaint: 3-month follow-up of chronic pain    HISTORY OF PRESENT ILLNESS: Patient is a 53 y.o. female patient who presents today to discuss the evaluation and management of:          1. Grief reaction    Patient is having a hard time today, her mother  3 weeks ago after a 3-week hospitalization for multiorgan failure.  She did not go to the doctor much, and ignored her health issues.  Patient is going through her belongings, and now has to take care of her disabled brother.  Fortunately, she has support from her  of 31 years.    2. Spondylosis of cervical region without myelopathy or radiculopathy    Patient has seen neurosurgery, had multiple procedures, and is primarily disabled.  She takes multiple medications including Norco, Lyrica, and Flexeril.  She actually has been taking less Flexeril since she has not been working.    3. Chronic use of opiate drugs therapeutic purposes    Overall impression that this patient is benefiting from opioid therapy and that the benefits outweigh the risks of continued use: yes     - Controlled Substance Use Agreement current or updated today   - Urine drug screen current or ordered today   - Additional lab: CMP to assess liver and renal function done in October   - Rx refill prescriptions are done for 3 months, No early refills. See orders   - Referral to pain management: no    4. Hyperglycemia    Patient had blood work done in October after not having labs for 5 years.  Her blood sugar was 234.  It was drawn at 1 in the afternoon, but patient states she was fasting.  She has no personal history of diabetes, however her brother was recently diagnosed.    5. Acquired hypothyroidism    Results for JAZZ ESPARZA (MRN [id number]) as of 2019 14:02   Ref. Range 10/23/2018 13:55   TSH Latest Ref Range: 0.380 - 5.330 uIU/mL 15.670 (H)   Free T-4 Latest Ref Range: 0.53  - 1.43 ng/dL 1.65 (H)     Confusing picture, patient has history of noncompliance, but is reportedly on Synthroid 200 mcg daily.        Patient Active Problem List    Diagnosis Date Noted   • Grief reaction 01/22/2019   • Hyperglycemia 01/22/2019   • Bilateral carpal tunnel syndrome 10/23/2018   • Anxiety 07/19/2018   • Morbid obesity with BMI of 50.0-59.9, adult (HCC) 02/01/2018   • DDD (degenerative disc disease), lumbar 09/26/2017   • Chronic use of opiate drugs therapeutic purposes 02/17/2017   • Acquired hypothyroidism 12/18/2015   • Spondylosis of cervical region without myelopathy or radiculopathy 08/28/2015   • Other migraine without status migrainosus, not intractable 08/28/2015   • Fibromyalgia 04/04/2013   • DJD (degenerative joint disease), cervical 04/04/2013   • ENDOMETRIOSIS 09/13/2011   • PCOS (polycystic ovarian syndrome) 02/01/2011   • GERD (gastroesophageal reflux disease) 02/01/2011        Allergies:Latex    Current meds including changes today  Current Outpatient Prescriptions   Medication Sig Dispense Refill   • [START ON 3/23/2019] HYDROcodone/acetaminophen (NORCO)  MG Tab Take 1 Tab by mouth every 8 hours as needed for Moderate Pain for up to 30 days. 90 Tab 0   • LYRICA 150 MG Cap TAKE ONE CAPSULE BY MOUTH EVERY MORNING AND TAKE TWO CAPSULES BY MOUTH EVERY EVENING 90 Cap 2   • topiramate (TOPAMAX) 50 MG tablet TAKE ONE TABLET BY MOUTH DAILY 90 Tab 1   • cyclobenzaprine (FLEXERIL) 10 MG Tab TAKE ONE TABLET BY MOUTH THREE TIMES A DAY AS NEEDED FOR MUSCLE SPASMS 270 Tab 1   • raNITidine (ZANTAC) 150 MG Tab TAKE TWO TABLETS BY MOUTH DAILY 180 Tab 0   •  MG Tab TAKE ONE TABLET BY MOUTH EVERY 8 HOURS AS NEEDED 270 Tab 0   • levothyroxine (SYNTHROID) 200 MCG Tab TAKE ONE TABLET BY MOUTH EVERY MORNING ON AN EMPTY STOMACH 30 Tab 2   • traZODone (DESYREL) 50 MG Tab TAKE ONE TABLET BY MOUTH AT BEDTIME 90 Tab 1   • sertraline (ZOLOFT) 100 MG Tab TAKE TWO TABLETS BY MOUTH DAILY 180 Tab 1   •  "omeprazole (PRILOSEC) 20 MG delayed-release capsule Take 1 Cap by mouth every day. 90 Cap 2   • POTASSIUM CHLORIDE by Does not apply route.     • BIOTIN by Does not apply route.       No current facility-administered medications for this visit.      Social History   Substance Use Topics   • Smoking status: Never Smoker   • Smokeless tobacco: Never Used      Comment: avoid all tobacco products   • Alcohol use No      Comment: occasionally     Social History     Social History Narrative   • No narrative on file       Family History   Problem Relation Age of Onset   • Hyperlipidemia Mother    • Cancer Mother         ? vocal cord   • Other Mother         multi organ failure/cardiac arrest   • Diabetes Sister    • Hypertension Sister    • Hyperlipidemia Sister    • Diabetes Brother    • Hypertension Brother    • Hyperlipidemia Brother    • Heart Disease Maternal Aunt    • Heart Disease Maternal Grandmother    • Diabetes Paternal Grandmother    • Stroke Neg Hx        Review of Systems:  No chest pain, No shortness of breath, No dyspnea on exertion  Gastrointestinal ROS: No abdominal pain, No nausea, vomiting, diarrhea, or constipation        Exam:      Blood pressure 134/88, pulse 76, temperature 36.4 °C (97.6 °F), temperature source Temporal, resp. rate 16, height 1.651 m (5' 5\"), weight (!) 138.7 kg (305 lb 12.5 oz), SpO2 94 %, not currently breastfeeding.  General: Morbidly obese female in NAD affect and mood within normal limits  Head is grossly normal.  Neck: Supple without adenopathy  Pulmonary: Clear to ausculation.  Normal effort. No rales, rhonchi, or wheezing.  Cardiovascular: Regular rate and rhythm without murmur.   Extremities: no clubbing, cyanosis, or trace puffy ankle and pedal edema  Neuro: moves all extremities symmetrically    Please note that this dictation was created using voice recognition software. I have made every reasonable attempt to correct obvious errors, but I expect that there are errors of " grammar and possibly content that I did not discover before finalizing the note.    Assessment/Plan:  1. Grief reaction    Offered supportive listening and empathy, at this time her grief reaction is appropriate and medication is not indicated    2. Spondylosis of cervical region without myelopathy or radiculopathy      - PAIN MANAGEMENT PANEL, SCRN W/ RFLX TO QNT; Future  - HYDROcodone/acetaminophen (NORCO)  MG Tab; Take 1 Tab by mouth every 8 hours as needed for Moderate Pain for up to 30 days.  Dispense: 90 Tab; Refill: 0  - unable to be checked, however patient states she only filled 2 of her 90-day prescriptions    3. Chronic use of opiate drugs therapeutic purposes    -See above    4. Hyperglycemia    Patient may have developed diabetes, she is at risk due to her obesity and family history  - BASIC METABOLIC PANEL; Future  - HEMOGLOBIN A1C; Future    5. Acquired hypothyroidism    - TSH WITH REFLEX TO FT4; Future    Followup: Return in about 3 months (around 4/22/2019).

## 2019-01-23 DIAGNOSIS — M47.812 SPONDYLOSIS OF CERVICAL REGION WITHOUT MYELOPATHY OR RADICULOPATHY: ICD-10-CM

## 2019-01-25 LAB
AMPHET CTO UR CFM-MCNC: NEGATIVE NG/ML
BARBITURATES CTO UR CFM-MCNC: NEGATIVE NG/ML
BENZODIAZ CTO UR CFM-MCNC: NEGATIVE NG/ML
BUPRENORPHINE UR-MCNC: NEGATIVE NG/ML
CANNABINOIDS CTO UR CFM-MCNC: NEGATIVE NG/ML
CARISOPRODOL UR-MCNC: NEGATIVE NG/ML
COCAINE CTO UR CFM-MCNC: NEGATIVE NG/ML
DRUG SCREEN COMMENT UR-IMP: NORMAL
ETHYL GLUCURONIDE UR QL SCN: NEGATIVE NG/ML
FENTANYL UR-MCNC: NEGATIVE NG/ML
MEPERIDINE CTO UR SCN-MCNC: NEGATIVE NG/ML
METHADONE CTO UR CFM-MCNC: NEGATIVE NG/ML
OPIATES UR QL SCN: POSITIVE NG/ML
OXYCDOXYM URSCRN 2005102: NEGATIVE NG/ML
PCP CTO UR CFM-MCNC: NEGATIVE NG/ML
PROPOXYPH CTO UR CFM-MCNC: NEGATIVE NG/ML
TAPENTADOL UR-MCNC: NEGATIVE NG/ML
TRAMADOL CTO UR SCN-MCNC: NEGATIVE NG/ML
ZOLPIDEM UR-MCNC: NEGATIVE NG/ML

## 2019-01-27 LAB
6MAM UR CFM-MCNC: <10 NG/ML
CODEINE UR CFM-MCNC: <20 NG/ML
HYDROCODONE UR CFM-MCNC: 636 NG/ML
HYDROMORPHONE UR CFM-MCNC: 20 NG/ML
MORPHINE UR CFM-MCNC: <20 NG/ML
NORHYDROCODONE UR CFM-MCNC: 1048 NG/ML
NOROXYCODONE UR CFM-MCNC: <20 NG/ML
OPIATES UR NOROXYM Q0836: <20 NG/ML
OXYCODONE UR CFM-MCNC: <20 NG/ML
OXYMORPHONE UR CFM-MCNC: <20 NG/ML

## 2019-02-08 DIAGNOSIS — K21.9 GASTROESOPHAGEAL REFLUX DISEASE WITHOUT ESOPHAGITIS: ICD-10-CM

## 2019-02-08 RX ORDER — RANITIDINE 150 MG/1
TABLET ORAL
Qty: 180 TAB | Refills: 1 | Status: SHIPPED | OUTPATIENT
Start: 2019-02-08 | End: 2019-08-07 | Stop reason: SDUPTHER

## 2019-02-08 RX ORDER — OMEPRAZOLE 20 MG/1
CAPSULE, DELAYED RELEASE ORAL
Qty: 90 CAP | Refills: 1 | Status: SHIPPED | OUTPATIENT
Start: 2019-02-08 | End: 2019-08-07 | Stop reason: SDUPTHER

## 2019-02-08 NOTE — TELEPHONE ENCOUNTER
Was the patient seen in the last year in this department? Yes    Does patient have an active prescription for medications requested? No     Received Request Via: Pharmacy      Future Appointments       Provider Department Center    4/18/2019 1:20 PM Bertha Hui M.D. [facility]

## 2019-04-10 RX ORDER — LEVOTHYROXINE SODIUM 0.2 MG/1
TABLET ORAL
Qty: 30 TAB | Refills: 2 | Status: SHIPPED | OUTPATIENT
Start: 2019-04-10 | End: 2019-07-08 | Stop reason: SDUPTHER

## 2019-05-02 ENCOUNTER — APPOINTMENT (OUTPATIENT)
Dept: MEDICAL GROUP | Facility: MEDICAL CENTER | Age: 54
End: 2019-05-02
Payer: COMMERCIAL

## 2019-05-07 ENCOUNTER — OFFICE VISIT (OUTPATIENT)
Dept: MEDICAL GROUP | Facility: MEDICAL CENTER | Age: 54
End: 2019-05-07
Payer: COMMERCIAL

## 2019-05-07 VITALS
TEMPERATURE: 97.9 F | BODY MASS INDEX: 47.09 KG/M2 | WEIGHT: 293 LBS | HEART RATE: 70 BPM | OXYGEN SATURATION: 93 % | SYSTOLIC BLOOD PRESSURE: 112 MMHG | RESPIRATION RATE: 14 BRPM | HEIGHT: 66 IN | DIASTOLIC BLOOD PRESSURE: 84 MMHG

## 2019-05-07 DIAGNOSIS — R73.9 HYPERGLYCEMIA: ICD-10-CM

## 2019-05-07 DIAGNOSIS — M47.812 SPONDYLOSIS OF CERVICAL REGION WITHOUT MYELOPATHY OR RADICULOPATHY: ICD-10-CM

## 2019-05-07 DIAGNOSIS — Z79.891 CHRONIC USE OF OPIATE DRUGS THERAPEUTIC PURPOSES: ICD-10-CM

## 2019-05-07 DIAGNOSIS — M79.2 CERVICAL SPINE ARTHRITIS WITH NERVE PAIN: ICD-10-CM

## 2019-05-07 DIAGNOSIS — M47.812 CERVICAL SPINE ARTHRITIS WITH NERVE PAIN: ICD-10-CM

## 2019-05-07 DIAGNOSIS — E66.01 MORBID OBESITY WITH BMI OF 50.0-59.9, ADULT (HCC): ICD-10-CM

## 2019-05-07 DIAGNOSIS — E03.9 ACQUIRED HYPOTHYROIDISM: ICD-10-CM

## 2019-05-07 PROBLEM — F43.21 GRIEF REACTION: Status: RESOLVED | Noted: 2019-01-22 | Resolved: 2019-05-07

## 2019-05-07 PROBLEM — F43.20 GRIEF REACTION: Status: RESOLVED | Noted: 2019-01-22 | Resolved: 2019-05-07

## 2019-05-07 LAB
HBA1C MFR BLD: 7.6 % (ref 0–5.6)
INT CON NEG: NEGATIVE
INT CON POS: POSITIVE

## 2019-05-07 PROCEDURE — 83036 HEMOGLOBIN GLYCOSYLATED A1C: CPT | Performed by: INTERNAL MEDICINE

## 2019-05-07 PROCEDURE — 99214 OFFICE O/P EST MOD 30 MIN: CPT | Performed by: INTERNAL MEDICINE

## 2019-05-07 RX ORDER — HYDROCODONE BITARTRATE AND ACETAMINOPHEN 10; 325 MG/1; MG/1
1 TABLET ORAL EVERY 8 HOURS PRN
Qty: 90 TAB | Refills: 0 | Status: SHIPPED | OUTPATIENT
Start: 2019-07-06 | End: 2019-06-04 | Stop reason: SDUPTHER

## 2019-05-07 RX ORDER — IBUPROFEN 800 MG/1
800 TABLET ORAL EVERY 8 HOURS PRN
Qty: 270 TAB | Refills: 0 | Status: SHIPPED | OUTPATIENT
Start: 2019-05-07 | End: 2020-01-08

## 2019-05-07 RX ORDER — PREGABALIN 150 MG/1
CAPSULE ORAL
Qty: 90 CAP | Refills: 2 | Status: SHIPPED
Start: 2019-05-07 | End: 2019-06-07

## 2019-05-07 RX ORDER — HYDROCODONE BITARTRATE AND ACETAMINOPHEN 10; 325 MG/1; MG/1
1 TABLET ORAL EVERY 8 HOURS PRN
Qty: 90 TAB | Refills: 0 | Status: SHIPPED | OUTPATIENT
Start: 2019-05-07 | End: 2019-05-07 | Stop reason: SDUPTHER

## 2019-05-07 RX ORDER — HYDROCODONE BITARTRATE AND ACETAMINOPHEN 10; 325 MG/1; MG/1
1 TABLET ORAL EVERY 8 HOURS PRN
Qty: 90 TAB | Refills: 0 | Status: SHIPPED | OUTPATIENT
Start: 2019-06-07 | End: 2019-05-07

## 2019-05-08 NOTE — PROGRESS NOTES
Chief Complaint   Patient presents with   • Follow-Up     medications      Chief complaint: 3-month follow-up of chronic pain, hyperglycemia    HISTORY OF PRESENT ILLNESS: Patient is a 53 y.o. female patient who presents today to discuss the evaluation and management of:    In general, patient has been struggling. her mother  almost 4 months ago suddenly.  She has a disabled brother who is currently living with her aunt, both are very unhappy and it is causing a lot of drama for the patient and her .  Her  is a disabled [place]  with PTSD.  He is 20 years older than the patient, and he is extremely supportive and helpful for her.      1. Spondylosis of cervical region without myelopathy or radiculopathy    Patient has a long history of neck and back pain, she was evaluated by , and followed by Dr. Loya.  She had several injections L2-L5 which were somewhat successful, however he left town.  She has been seen in follow-up by nurse practitioner at Dr. nicholas's office who reviewed patient's imaging and case with Dr. nicholas, he did not feel there was any further surgical intervention that would be helpful for her.  She currently takes ibuprofen 800 mg 2-3 times a day co-10/325 2-3 times per day.    2. Cervical spine arthritis with nerve pain    Patient also takes Lyrica 150 mg 3 times a day    3. Chronic use of opiate drugs therapeutic purposes    Patient actually has been using less than her allotted 90 tablets/month.  She benefits greatly from the medication, and would not do well without it.      Overall impression that this patient is benefiting from opioid therapy and that the benefits outweigh the risks of continued use: yes     - Controlled Substance Use Agreement current or updated today   - Urine drug screen current or ordered today.  Done 2019   - Additional lab: CMP to assess liver and renal function normal kidney and liver function 2018   - Rx refill  prescriptions are done for 3 months, No early refills. See orders   - Referral to pain management: no    4. Acquired hypothyroidism    Currently on Synthroid 200 mcg daily, patient has yet to do her TSH.    5. Morbid obesity with BMI of 50.0-59.9, adult ([facility])    Unfortunately, patient's weight is up 6 pounds to 311.  She acknowledges that she has been dealing with her stress poorly, and is eating more carbs.    6. Hyperglycemia    Random fasting blood sugar 234, hemoglobin A1c today in office 7.6.  Consistent with early diabetes.        Patient Active Problem List    Diagnosis Date Noted   • Hyperglycemia 01/22/2019   • Bilateral carpal tunnel syndrome 10/23/2018   • Anxiety 07/19/2018   • Morbid obesity with BMI of 50.0-59.9, adult ([facility]) 02/01/2018   • DDD (degenerative disc disease), lumbar 09/26/2017   • Chronic use of opiate drugs therapeutic purposes 02/17/2017   • Acquired hypothyroidism 12/18/2015   • Spondylosis of cervical region without myelopathy or radiculopathy 08/28/2015   • Other migraine without status migrainosus, not intractable 08/28/2015   • Fibromyalgia 04/04/2013   • DJD (degenerative joint disease), cervical 04/04/2013   • ENDOMETRIOSIS 09/13/2011   • PCOS (polycystic ovarian syndrome) 02/01/2011   • GERD (gastroesophageal reflux disease) 02/01/2011        Allergies:Latex    Current meds including changes today  Current Outpatient Prescriptions   Medication Sig Dispense Refill   • pregabalin (LYRICA) 150 MG Cap TAKE ONE CAPSULE BY MOUTH EVERY MORNING AND TAKE TWO CAPSULES BY MOUTH EVERY EVENING 90 Cap 2   • [START ON 7/6/2019] HYDROcodone/acetaminophen (NORCO)  MG Tab Take 1 Tab by mouth every 8 hours as needed for Moderate Pain for up to 30 days. 90 Tab 0   • ibuprofen (IBU) 800 MG Tab Take 1 Tab by mouth every 8 hours as needed. 270 Tab 0   • levothyroxine (SYNTHROID) 200 MCG Tab TAKE ONE TABLET BY MOUTH EVERY MORNING ON AN EMPTY STOMACH 30 Tab 2   • sertraline (ZOLOFT) 100 MG Tab TAKE  "TWO TABLETS BY MOUTH DAILY 180 Tab 1   • traZODone (DESYREL) 50 MG Tab TAKE ONE TABLET BY MOUTH AT BEDTIME 90 Tab 1   • omeprazole (PRILOSEC) 20 MG delayed-release capsule TAKE ONE CAPSULE BY MOUTH DAILY 90 Cap 1   • raNITidine (ZANTAC) 150 MG Tab TAKE TWO TABLETS BY MOUTH DAILY 180 Tab 1   • topiramate (TOPAMAX) 50 MG tablet TAKE ONE TABLET BY MOUTH DAILY 90 Tab 1   • BIOTIN by Does not apply route.     • cyclobenzaprine (FLEXERIL) 10 MG Tab TAKE ONE TABLET BY MOUTH THREE TIMES A DAY AS NEEDED FOR MUSCLE SPASMS 270 Tab 1   • POTASSIUM CHLORIDE by Does not apply route.       No current facility-administered medications for this visit.      Social History   Substance Use Topics   • Smoking status: Never Smoker   • Smokeless tobacco: Never Used      Comment: avoid all tobacco products   • Alcohol use No      Comment: occasionally     Social History     Social History Narrative   • No narrative on file       Family History   Problem Relation Age of Onset   • Hyperlipidemia Mother    • Cancer Mother         ? vocal cord   • Other Mother         multi organ failure/cardiac arrest   • Diabetes Sister    • Hypertension Sister    • Hyperlipidemia Sister    • Diabetes Brother    • Hypertension Brother    • Hyperlipidemia Brother    • Heart Disease Maternal Aunt    • Heart Disease Maternal Grandmother    • Diabetes Paternal Grandmother    • Stroke Neg Hx        Review of Systems:  No chest pain, No shortness of breath, No dyspnea on exertion  Gastrointestinal ROS: No abdominal pain, No nausea, vomiting, diarrhea, or constipation        Exam:      /84 (BP Location: Left arm, Patient Position: Sitting, BP Cuff Size: Adult)   Pulse 70   Temp 36.6 °C (97.9 °F) (Temporal)   Resp 14   Ht 1.676 m (5' 6\")   Wt (!) 141.4 kg (311 lb 12.8 oz)   SpO2 93%   General: Morbidly obese female in NAD affect and mood within normal limits  Head is grossly normal.  Neck: Supple without adenopathy  Pulmonary: Clear to ausculation.  " Normal effort. No rales, rhonchi, or wheezing.  Cardiovascular: Regular rate and rhythm without murmur.   Extremities: no clubbing, cyanosis, or edema.  Neuro: moves all extremities symmetrically    Please note that this dictation was created using voice recognition software. I have made every reasonable attempt to correct obvious errors, but I expect that there are errors of grammar and possibly content that I did not discover before finalizing the note.    Assessment/Plan:  1. Spondylosis of cervical region without myelopathy or radiculopathy    -3-month supply of Norco printed prescriptions given to patient  - HYDROcodone/acetaminophen (NORCO)  MG Tab; Take 1 Tab by mouth every 8 hours as needed for Moderate Pain for up to 30 days.  Dispense: 90 Tab; Refill: 0  - Consent for Opiate Prescription    2. Cervical spine arthritis with nerve pain      - pregabalin (LYRICA) 150 MG Cap; TAKE ONE CAPSULE BY MOUTH EVERY MORNING AND TAKE TWO CAPSULES BY MOUTH EVERY EVENING  Dispense: 90 Cap; Refill: 2  - Controlled Substance Treatment Agreement    3. Chronic use of opiate drugs therapeutic purposes      - Controlled Substance Treatment Agreement  - HYDROcodone/acetaminophen (NORCO)  MG Tab; Take 1 Tab by mouth every 8 hours as needed for Moderate Pain for up to 30 days.  Dispense: 90 Tab; Refill: 0  - Consent for Opiate Prescription    4. Acquired hypothyroidism    -Patient was given printed lab slip for TSH and repeat 6-month metabolic panel in case she wants to go to [facility]  - TSH WITH REFLEX TO FT4; Future    5. Morbid obesity with BMI of 50.0-59.9, adult (HCC)    Patient has been unable to lose weight, she is unable to do any exercise due to her chronic pain      6. Hyperglycemia    Patient has diabetes, at this time she is not wanting to start a medication but would like to try to alter her diet to a more healthy Mediterranean diet that she used to consume.  - POCT  A1C    Total 35 minutes face-to-face time  spent with patient, with greater than 50% of the total time discussing patient's issues and symptoms as listed above in assessment and plan, as well as managing coordination of care for future evaluation and treatment.  Followup: Return in about 4 weeks (around 6/4/2019).

## 2019-06-04 ENCOUNTER — OFFICE VISIT (OUTPATIENT)
Dept: MEDICAL GROUP | Facility: MEDICAL CENTER | Age: 54
End: 2019-06-04
Payer: COMMERCIAL

## 2019-06-04 VITALS
BODY MASS INDEX: 47.09 KG/M2 | HEART RATE: 72 BPM | WEIGHT: 293 LBS | RESPIRATION RATE: 16 BRPM | HEIGHT: 66 IN | OXYGEN SATURATION: 93 % | TEMPERATURE: 98 F | DIASTOLIC BLOOD PRESSURE: 76 MMHG | SYSTOLIC BLOOD PRESSURE: 138 MMHG

## 2019-06-04 DIAGNOSIS — M47.812 SPONDYLOSIS OF CERVICAL REGION WITHOUT MYELOPATHY OR RADICULOPATHY: ICD-10-CM

## 2019-06-04 DIAGNOSIS — Z79.891 CHRONIC USE OF OPIATE DRUGS THERAPEUTIC PURPOSES: ICD-10-CM

## 2019-06-04 DIAGNOSIS — E11.9 TYPE 2 DIABETES MELLITUS WITHOUT COMPLICATION, WITHOUT LONG-TERM CURRENT USE OF INSULIN (HCC): ICD-10-CM

## 2019-06-04 DIAGNOSIS — M51.36 DDD (DEGENERATIVE DISC DISEASE), LUMBAR: ICD-10-CM

## 2019-06-04 PROBLEM — R73.9 HYPERGLYCEMIA: Status: RESOLVED | Noted: 2019-01-22 | Resolved: 2019-06-04

## 2019-06-04 LAB — GLUCOSE BLD-MCNC: 120 MG/DL (ref 70–100)

## 2019-06-04 PROCEDURE — 99214 OFFICE O/P EST MOD 30 MIN: CPT | Performed by: INTERNAL MEDICINE

## 2019-06-04 PROCEDURE — 82962 GLUCOSE BLOOD TEST: CPT | Performed by: INTERNAL MEDICINE

## 2019-06-04 RX ORDER — TOPIRAMATE 50 MG/1
TABLET, FILM COATED ORAL
Qty: 90 TAB | Refills: 1 | Status: SHIPPED | OUTPATIENT
Start: 2019-06-04 | End: 2019-12-07 | Stop reason: SDUPTHER

## 2019-06-04 RX ORDER — HYDROCODONE BITARTRATE AND ACETAMINOPHEN 10; 325 MG/1; MG/1
1 TABLET ORAL EVERY 8 HOURS PRN
Qty: 90 TAB | Refills: 0 | Status: SHIPPED | OUTPATIENT
Start: 2019-07-06 | End: 2019-08-05

## 2019-06-04 NOTE — PROGRESS NOTES
Chief Complaint   Patient presents with   • Weight Gain     follow up     Chief complaint: Follow-up chronic pain, new diagnosis of diabetes, obesity    HISTORY OF PRESENT ILLNESS: Patient is a 53 y.o. female patient who presents today to discuss the evaluation and management of:          1. DDD (degenerative disc disease), lumbar    Patient brings with her today a disability evaluation that she had done in August 2018 by Dr. Funk.  He reviewed her medical records which included history of 2 lumbar spine surgeries with decompression and fusion in 2009, and anterior fusion in 2013.  She had another MRI in 2017, which revealed facet hypertrophy throughout lumbar spine to sacrum.  She continues to have chronic back pain, which now is present in her thoracic region radiating to her rib cage as well.    2. Spondylosis of cervical region without myelopathy or radiculopathy    Regarding her cervical spine, she has had 3 cervical decompression and fusions, last in 2017 by Dr. nicholas.  She continues to have significant neck pain with some radiation down both arms.  Both of these have resulted in significant functional impairment, patient has not worked for about 1 year.  Previously, she was employed by the [employer].  Dr. Funk concluded that patient is significantly disabled, and unlikely to have return to function such that she would be able to return to work.    In regards to controlling her pain, patient is on Lyrica, and Norco.  She takes Norco less often than she could, as she is concerned about overusing it, and being labeled as a drug user.  She is prescribed 7.5 mg 3 times a day, but rarely takes that much.    3. Type 2 diabetes mellitus without complication, without long-term current use of insulin (HCC)    I recently diagnosed patient with diabetes, she had a random blood sugar of 230, and hemoglobin A1c in the office of 7.4.  Unfortunately, patient has not gone to have her lab work done to confirm  this.  She has had a series of family issues, including the death of her mother, and now is trying to take care of her disabled brother and aunt.    Patient remains morbidly obese, with BMI of 50.  She admits to significant dietary indiscretion over the last couple of years.    4. Chronic use of opiate drugs therapeutic purposes    As above, patient is currently taking Norco 7.5/325 2-3 times per day.  She had urine drug screen in January 2019 which was negative for un expected findings.        Patient Active Problem List    Diagnosis Date Noted   • Type 2 diabetes mellitus without complication, without long-term current use of insulin ([facility]) 06/04/2019   • Bilateral carpal tunnel syndrome 10/23/2018   • Anxiety 07/19/2018   • Morbid obesity with BMI of 50.0-59.9, adult ([facility]) 02/01/2018   • DDD (degenerative disc disease), lumbar 09/26/2017   • Chronic use of opiate drugs therapeutic purposes 02/17/2017   • Acquired hypothyroidism 12/18/2015   • Spondylosis of cervical region without myelopathy or radiculopathy 08/28/2015   • Other migraine without status migrainosus, not intractable 08/28/2015   • Fibromyalgia 04/04/2013   • ENDOMETRIOSIS 09/13/2011   • PCOS (polycystic ovarian syndrome) 02/01/2011   • GERD (gastroesophageal reflux disease) 02/01/2011        Allergies:Latex    Current meds including changes today  Current Outpatient Prescriptions   Medication Sig Dispense Refill   • topiramate (TOPAMAX) 50 MG tablet TAKE ONE TABLET BY MOUTH DAILY 90 Tab 1   • [START ON 7/6/2019] HYDROcodone/acetaminophen (NORCO)  MG Tab Take 1 Tab by mouth every 8 hours as needed for Moderate Pain for up to 30 days. 90 Tab 0   • pregabalin (LYRICA) 150 MG Cap TAKE ONE CAPSULE BY MOUTH EVERY MORNING AND TAKE TWO CAPSULES BY MOUTH EVERY EVENING 90 Cap 2   • ibuprofen (IBU) 800 MG Tab Take 1 Tab by mouth every 8 hours as needed. 270 Tab 0   • levothyroxine (SYNTHROID) 200 MCG Tab TAKE ONE TABLET BY MOUTH EVERY MORNING ON AN EMPTY  "STOMACH 30 Tab 2   • sertraline (ZOLOFT) 100 MG Tab TAKE TWO TABLETS BY MOUTH DAILY 180 Tab 1   • traZODone (DESYREL) 50 MG Tab TAKE ONE TABLET BY MOUTH AT BEDTIME 90 Tab 1   • omeprazole (PRILOSEC) 20 MG delayed-release capsule TAKE ONE CAPSULE BY MOUTH DAILY 90 Cap 1   • raNITidine (ZANTAC) 150 MG Tab TAKE TWO TABLETS BY MOUTH DAILY 180 Tab 1   • cyclobenzaprine (FLEXERIL) 10 MG Tab TAKE ONE TABLET BY MOUTH THREE TIMES A DAY AS NEEDED FOR MUSCLE SPASMS 270 Tab 1   • POTASSIUM CHLORIDE by Does not apply route.     • BIOTIN by Does not apply route.       No current facility-administered medications for this visit.      Social History   Substance Use Topics   • Smoking status: Never Smoker   • Smokeless tobacco: Never Used      Comment: avoid all tobacco products   • Alcohol use No      Comment: occasionally     Social History     Social History Narrative   • No narrative on file       Family History   Problem Relation Age of Onset   • Hyperlipidemia Mother    • Cancer Mother         ? vocal cord   • Other Mother         multi organ failure/cardiac arrest   • Diabetes Sister    • Hypertension Sister    • Hyperlipidemia Sister    • Diabetes Brother    • Hypertension Brother    • Hyperlipidemia Brother    • Heart Disease Maternal Aunt    • Heart Disease Maternal Grandmother    • Diabetes Paternal Grandmother    • Stroke Neg Hx          Exam:      /76 (BP Location: Right arm, Patient Position: Sitting)   Pulse 72   Temp 36.7 °C (98 °F) (Temporal)   Resp 16   Ht 1.676 m (5' 6\")   Wt (!) 142.4 kg (313 lb 15 oz)   SpO2 93%   General: Morbidly obese female in NAD affect and mood within normal limits  Head is grossly normal.  Neck: Supple without adenopathy  Pulmonary: Clear to ausculation.  Normal effort. No rales, rhonchi, or wheezing.  Cardiovascular: Regular rate and rhythm without murmur.   Extremities: no clubbing, cyanosis, or edema.  Neuro: moves all extremities symmetrically    Please note that this " dictation was created using voice recognition software. I have made every reasonable attempt to correct obvious errors, but I expect that there are errors of grammar and possibly content that I did not discover before finalizing the note.    Assessment/Plan:  1. DDD (degenerative disc disease), lumbar    -I believe patient is significantly disabled from both her weight and her chronic pain.  She will need to work with her new physician and possibly Dr. nicholas her neurosurgeon to obtain complete disability.  In the interim, she is appropriate to continue on her current level of pain medication.  Patient is not misusing it, and in fact under utilizes it due to her concerns of being labeled.    - HYDROcodone/acetaminophen (NORCO)  MG Tab; Take 1 Tab by mouth every 8 hours as needed for Moderate Pain for up to 30 days.  Dispense: 90 Tab; Refill: 0    2. Spondylosis of cervical region without myelopathy or radiculopathy      - HYDROcodone/acetaminophen (NORCO)  MG Tab; Take 1 Tab by mouth every 8 hours as needed for Moderate Pain for up to 30 days.  Dispense: 90 Tab; Refill: 0    3. Type 2 diabetes mellitus without complication, without long-term current use of insulin (HCC)    Fingerstick in office 120, patient is fasting she states.  I encouraged her to get fasting blood work done, we discussed that if her A1c is significantly elevated I would recommend starting low-dose metformin.    4. Chronic use of opiate drugs therapeutic purposes        - HYDROcodone/acetaminophen (NORCO)  MG Tab; Take 1 Tab by mouth every 8 hours as needed for Moderate Pain for up to 30 days.  Dispense: 90 Tab; Refill: 0    Followup: Patient will need to establish with a new PCP in 3 months, for follow-up of multiple issues as above, and continued treatment of her chronic pain.

## 2019-06-06 ENCOUNTER — PATIENT MESSAGE (OUTPATIENT)
Dept: MEDICAL GROUP | Facility: MEDICAL CENTER | Age: 54
End: 2019-06-06

## 2019-06-11 NOTE — TELEPHONE ENCOUNTER
"From: Gail Elias  To: Bertha Hui  Sent: 6/6/2019 2:16 PM PDT  Subject: Test Result Question    I looked up hyperglycemia and it said, \"Because one symptom of polycystic ovary syndrome is insulin resistance, women with this condition are considered at higher risk for diabetes as well.\" I was diagnosed with that in the mid 90's, but had had problems with it likely since my teens. Also likely why I could never have children. Then, in an effort to control the out of control bleeding I was having, I was put on birth control pills and only took myself off them a couple of years ago. Long term contraceptive use is said to be a cause of hyperglycemia, too.  ----- Message -----  From: Bertha Hui  Sent: 6/6/2019 1:11 PM PDT  To: Gail Elias  Subject: RE: Test Result Question  Unruly Reynolds,    Your HBA1C and blood sugar(random) of 234 place you in the category of diabetes. I did not want to just have hyperglycemia as a problem in case it was missed. It is important enough, that I want your new provider to be able to see it and follow-up.     Dr Hui    ----- Message -----   From: Gail Elias   Sent: 6/6/2019 12:45 PM PDT   To: Bertha Hui  Subject: Test Result Question    Dr. Hui,    I noticed that you added Type 2 Diabetes to my list of maladies. However, my test results have not indicated that I have it. What gives?    "

## 2019-06-24 ENCOUNTER — TELEPHONE (OUTPATIENT)
Dept: MEDICAL GROUP | Facility: MEDICAL CENTER | Age: 54
End: 2019-06-24

## 2019-06-24 NOTE — TELEPHONE ENCOUNTER
Pt. Pharmacy called stating they just wanted to verify the day supply for the Lyrica prescribed to pt.

## 2019-06-25 NOTE — TELEPHONE ENCOUNTER
Spoke with pharmacy they state that prescription was already filled and the letter sent to us asking for a day supply was sent on accident.

## 2019-07-09 RX ORDER — LEVOTHYROXINE SODIUM 0.2 MG/1
TABLET ORAL
Qty: 30 TAB | Refills: 5 | Status: SHIPPED | OUTPATIENT
Start: 2019-07-09 | End: 2020-01-06

## 2019-08-07 DIAGNOSIS — K21.9 GASTROESOPHAGEAL REFLUX DISEASE WITHOUT ESOPHAGITIS: ICD-10-CM

## 2019-08-07 RX ORDER — RANITIDINE 150 MG/1
TABLET ORAL
Qty: 180 TAB | Refills: 1 | Status: SHIPPED | OUTPATIENT
Start: 2019-08-07 | End: 2020-02-04 | Stop reason: SDUPTHER

## 2019-08-07 RX ORDER — OMEPRAZOLE 20 MG/1
20 CAPSULE, DELAYED RELEASE ORAL DAILY
Qty: 90 CAP | Refills: 1 | Status: SHIPPED | OUTPATIENT
Start: 2019-08-07 | End: 2020-02-04 | Stop reason: SDUPTHER

## 2019-09-09 DIAGNOSIS — F32.5 MAJOR DEPRESSION IN REMISSION (HCC): ICD-10-CM

## 2019-09-09 RX ORDER — SERTRALINE HYDROCHLORIDE 100 MG/1
TABLET, FILM COATED ORAL
Qty: 180 TAB | Refills: 1 | Status: SHIPPED | OUTPATIENT
Start: 2019-09-09 | End: 2020-03-12

## 2019-09-09 RX ORDER — TRAZODONE HYDROCHLORIDE 50 MG/1
TABLET ORAL
Qty: 90 TAB | Refills: 1 | Status: SHIPPED | OUTPATIENT
Start: 2019-09-09 | End: 2020-03-12

## 2019-10-02 ENCOUNTER — OFFICE VISIT (OUTPATIENT)
Dept: MEDICAL GROUP | Facility: CLINIC | Age: 54
End: 2019-10-02
Payer: COMMERCIAL

## 2019-10-02 VITALS
OXYGEN SATURATION: 95 % | HEART RATE: 74 BPM | WEIGHT: 293 LBS | TEMPERATURE: 97.3 F | HEIGHT: 65 IN | SYSTOLIC BLOOD PRESSURE: 126 MMHG | DIASTOLIC BLOOD PRESSURE: 74 MMHG | BODY MASS INDEX: 48.82 KG/M2

## 2019-10-02 DIAGNOSIS — E03.9 ACQUIRED HYPOTHYROIDISM: ICD-10-CM

## 2019-10-02 DIAGNOSIS — R19.7 DIARRHEA, UNSPECIFIED TYPE: ICD-10-CM

## 2019-10-02 DIAGNOSIS — M47.812 SPONDYLOSIS OF CERVICAL REGION WITHOUT MYELOPATHY OR RADICULOPATHY: ICD-10-CM

## 2019-10-02 DIAGNOSIS — Z23 NEED FOR VACCINATION: ICD-10-CM

## 2019-10-02 DIAGNOSIS — E11.9 TYPE 2 DIABETES MELLITUS WITHOUT COMPLICATION, WITHOUT LONG-TERM CURRENT USE OF INSULIN (HCC): ICD-10-CM

## 2019-10-02 DIAGNOSIS — M51.36 DDD (DEGENERATIVE DISC DISEASE), LUMBAR: ICD-10-CM

## 2019-10-02 DIAGNOSIS — M79.7 FIBROMYALGIA: ICD-10-CM

## 2019-10-02 PROBLEM — G89.29 ENCOUNTER FOR CHRONIC PAIN MANAGEMENT: Status: ACTIVE | Noted: 2019-10-02

## 2019-10-02 PROCEDURE — 99214 OFFICE O/P EST MOD 30 MIN: CPT | Mod: 25 | Performed by: FAMILY MEDICINE

## 2019-10-02 PROCEDURE — 90686 IIV4 VACC NO PRSV 0.5 ML IM: CPT | Performed by: FAMILY MEDICINE

## 2019-10-02 PROCEDURE — 90471 IMMUNIZATION ADMIN: CPT | Performed by: FAMILY MEDICINE

## 2019-10-02 RX ORDER — PIOGLITAZONEHYDROCHLORIDE 15 MG/1
15 TABLET ORAL DAILY
Qty: 30 TAB | Refills: 5 | Status: SHIPPED
Start: 2019-10-02 | End: 2020-07-06 | Stop reason: CLARIF

## 2019-10-02 RX ORDER — HYDROCODONE BITARTRATE AND ACETAMINOPHEN 10; 325 MG/1; MG/1
TABLET ORAL
COMMUNITY
Start: 2019-08-19 | End: 2019-10-02 | Stop reason: SDUPTHER

## 2019-10-02 RX ORDER — HYDROCODONE BITARTRATE AND ACETAMINOPHEN 10; 325 MG/1; MG/1
1 TABLET ORAL EVERY 8 HOURS PRN
Qty: 90 TAB | Refills: 0 | Status: SHIPPED | OUTPATIENT
Start: 2019-10-02 | End: 2019-11-01

## 2019-10-02 RX ORDER — LEVOTHYROXINE SODIUM 0.07 MG/1
75 TABLET ORAL
Qty: 30 TAB | Refills: 5 | Status: SHIPPED
Start: 2019-10-02 | End: 2020-07-06 | Stop reason: CLARIF

## 2019-10-02 RX ORDER — PREGABALIN 150 MG/1
150 CAPSULE ORAL DAILY
COMMUNITY
End: 2019-10-02 | Stop reason: SDUPTHER

## 2019-10-02 RX ORDER — PREGABALIN 150 MG/1
CAPSULE ORAL
Qty: 90 CAP | Refills: 2 | Status: SHIPPED | OUTPATIENT
Start: 2019-10-02 | End: 2020-01-02

## 2019-10-02 NOTE — ASSESSMENT & PLAN NOTE
Needs refill [place] and Lyrica, for chronic low back pain respectively fibromyalgia. Was previously getting these rx's from her PCP Dr. Hui, at our [facility]. Does not tolerate generic pregabalin.

## 2019-10-02 NOTE — PROGRESS NOTES
Complaint: Needs pain med refills/ establish care.     Subjective:     Gail Elias is a 54 y.o. female here today for a number issues. Previously followed at our [facility] in [place] by Dr. Hui.    Encounter for chronic pain management  Needs refill [place] and Lyrica, for chronic low back pain respectively fibromyalgia. Was previously getting these rx's from her PCP Dr. Hui, at our [facility]. Does not tolerate generic pregabalin.     Acquired hypothyroidism  Last TSH was 15.6 a year ago, currently on 200 mcg/d of Synthroid.    Diarrhea  For past month has been experiencing loose watery stools, no blood or pus , occasional cramping. Not on antibiotics in recent past. No change in appetite or weight loss.     Patient unaware she has DM (A1c 7.6 in May 2019), not on any meds. Agreeable to start on BS lowering med. Will avoid metformin due to diarrhea.     Current medicines (including changes today)  Current Outpatient Medications   Medication Sig Dispense Refill   • pregabalin (LYRICA) 150 MG Cap Take 1 tab in AM, 2 at bedtime daily. 90 Cap 2   • HYDROcodone/acetaminophen (NORCO)  MG Tab Take 1 Tab by mouth every 8 hours as needed for up to 30 days. 90 Tab 0   • pioglitazone (ACTOS) 15 MG Tab Take 1 Tab by mouth every day. 30 Tab 5   • levothyroxine (SYNTHROID) 75 MCG Tab Take 1 Tab by mouth Every morning on an empty stomach. In addition to 200 mcg tablet daily. 30 Tab 5   • sertraline (ZOLOFT) 100 MG Tab TAKE TWO TABLETS BY MOUTH DAILY 180 Tab 1   • traZODone (DESYREL) 50 MG Tab TAKE ONE TABLET BY MOUTH AT BEDTIME 90 Tab 1   • omeprazole (PRILOSEC) 20 MG delayed-release capsule Take 1 Cap by mouth every day. 90 Cap 1   • raNITidine (ZANTAC) 150 MG Tab TAKE TWO TABLETS BY MOUTH DAILY 180 Tab 1   • levothyroxine (SYNTHROID) 200 MCG Tab TAKE ONE TABLET BY MOUTH EVERY MORNING ON AN EMPTY STOMACH 30 Tab 5   • topiramate (TOPAMAX) 50 MG tablet TAKE ONE TABLET BY MOUTH DAILY 90 Tab 1    • ibuprofen (IBU) 800 MG Tab Take 1 Tab by mouth every 8 hours as needed. 270 Tab 0   • cyclobenzaprine (FLEXERIL) 10 MG Tab TAKE ONE TABLET BY MOUTH THREE TIMES A DAY AS NEEDED FOR MUSCLE SPASMS 270 Tab 1   • BIOTIN by Does not apply route.       No current facility-administered medications for this visit.      She  has a past medical history of Anxiety disorder (2/1/2011), Family history of diabetes mellitus (DM) (2/1/2011), GERD (gastroesophageal reflux disease) (2/1/2011), Migraine headache (2/1/2011), and PCOS (polycystic ovarian syndrome) (2/1/2011).    Health Maintenance: requesting flu shot.      Allergies: Latex    Current Outpatient Medications Ordered in Epic   Medication Sig Dispense Refill   • pregabalin (LYRICA) 150 MG Cap Take 1 tab in AM, 2 at bedtime daily. 90 Cap 2   • HYDROcodone/acetaminophen (NORCO)  MG Tab Take 1 Tab by mouth every 8 hours as needed for up to 30 days. 90 Tab 0   • pioglitazone (ACTOS) 15 MG Tab Take 1 Tab by mouth every day. 30 Tab 5   • levothyroxine (SYNTHROID) 75 MCG Tab Take 1 Tab by mouth Every morning on an empty stomach. In addition to 200 mcg tablet daily. 30 Tab 5   • sertraline (ZOLOFT) 100 MG Tab TAKE TWO TABLETS BY MOUTH DAILY 180 Tab 1   • traZODone (DESYREL) 50 MG Tab TAKE ONE TABLET BY MOUTH AT BEDTIME 90 Tab 1   • omeprazole (PRILOSEC) 20 MG delayed-release capsule Take 1 Cap by mouth every day. 90 Cap 1   • raNITidine (ZANTAC) 150 MG Tab TAKE TWO TABLETS BY MOUTH DAILY 180 Tab 1   • levothyroxine (SYNTHROID) 200 MCG Tab TAKE ONE TABLET BY MOUTH EVERY MORNING ON AN EMPTY STOMACH 30 Tab 5   • topiramate (TOPAMAX) 50 MG tablet TAKE ONE TABLET BY MOUTH DAILY 90 Tab 1   • ibuprofen (IBU) 800 MG Tab Take 1 Tab by mouth every 8 hours as needed. 270 Tab 0   • cyclobenzaprine (FLEXERIL) 10 MG Tab TAKE ONE TABLET BY MOUTH THREE TIMES A DAY AS NEEDED FOR MUSCLE SPASMS 270 Tab 1   • BIOTIN by Does not apply route.       No current [facility]-ordered facility-administered  "medications on file.        Past Medical History:   Diagnosis Date   • Anxiety disorder 2/1/2011   • Family history of diabetes mellitus (DM) 2/1/2011   • GERD (gastroesophageal reflux disease) 2/1/2011   • Migraine headache 2/1/2011   • PCOS (polycystic ovarian syndrome) 2/1/2011       Past Surgical History:   Procedure Laterality Date   • ABDOMINAL EXPLORATION     • CERVICAL DISK AND FUSION ANTERIOR     • LAPAROSCOPY     • LUMBAR LAMINECTOMY DISKECTOMY     • TONSILLECTOMY AND ADENOIDECTOMY         Social History     Tobacco Use   • Smoking status: Never Smoker   • Smokeless tobacco: Never Used   • Tobacco comment: avoid all tobacco products   Substance Use Topics   • Alcohol use: No     Alcohol/week: 0.0 oz     Comment: occasionally   • Drug use: No       Social History     Social History Narrative   • Not on file       Family History   Problem Relation Age of Onset   • Hyperlipidemia Mother    • Cancer Mother         ? vocal cord   • Other Mother         multi organ failure/cardiac arrest   • Diabetes Sister    • Hypertension Sister    • Hyperlipidemia Sister    • Diabetes Brother    • Hypertension Brother    • Hyperlipidemia Brother    • Heart Disease Maternal Aunt    • Heart Disease Maternal Grandmother    • Diabetes Paternal Grandmother    • Stroke Neg Hx          ROS Positive for loose stools, low back pain and diffuse achiness.  Patient denies any fever, chills, unintentional weight gain/loss, fatigue, stroke symptoms, dizziness, headache, nasal congestion, sore-throat, cough, heartburn, chest pain, difficulty breathing, abdominal discomfort, constipation, burning with urination or frequency,  skin rashes, depression or anxiety.       Objective:     /74 (BP Location: Left arm, Patient Position: Sitting)   Pulse 74   Temp 36.3 °C (97.3 °F) (Temporal)   Ht 1.651 m (5' 5\")   Wt (!) 143.2 kg (315 lb 12.8 oz)   SpO2 95%  Body mass index is 52.55 kg/m².   Physical Exam:  Constitutional: Alert, no " distress.  Morbidly obese.  Respiratory: Unlabored respiratory effort, lungs clear to auscultation, no wheezes, no ronchi.  Cardiovascular: Normal S1, S2, no murmur, no extremity edema.    Psych: Alert and oriented x3, flat affect and mood.        Assessment and Plan:   The following treatment plan was discussed    1. Fibromyalgia  Controlled on meds. UDS UTD. Unable to check Narxcheck today.  - pregabalin (LYRICA) 150 MG Cap; Take 1 tab in AM, 2 at bedtime daily.  Dispense: 90 Cap; Refill: 2  - Controlled Substance Treatment Agreement    2. Spondylosis of cervical region without myelopathy or radiculopathy  As above  - HYDROcodone/acetaminophen (NORCO)  MG Tab; Take 1 Tab by mouth every 8 hours as needed for up to 30 days.  Dispense: 90 Tab; Refill: 0  - Controlled Substance Treatment Agreement    3. DDD (degenerative disc disease), lumbar  As above.  - HYDROcodone/acetaminophen (NORCO)  MG Tab; Take 1 Tab by mouth every 8 hours as needed for up to 30 days.  Dispense: 90 Tab; Refill: 0  - Controlled Substance Treatment Agreement    4. Diarrhea, unspecified type  Avoid milk. Will notify with results, treat if needed.  - Cdiff By PCR Rflx Toxin; Future  - STOOL WBC'S; Future  - CULTURE STOOL; Future    5. Need for vaccination  - Influenza Vaccine Quad Injection (PF)    6. Acquired hypothyroidism  Will increase her dose to 275 mcg/day. Recheck TSH 6 weeks.     7. Type 2 Diabetes mellitus  Start Actos 15 mg daily.      Will need to find PCP willing to continue her pain management.    Followup: Return in about 4 weeks (around 10/30/2019).    Please note that this dictation was created using voice recognition software. I have made every reasonable attempt to correct obvious errors, but I expect that there are errors of grammar and possibly content that I did not discover before finalizing the note.

## 2019-10-02 NOTE — ASSESSMENT & PLAN NOTE
Patient unaware she has DM, not on any meds. Agreeable to start on BS lowering med. Will avoid metformin due to diarrhea.

## 2019-10-02 NOTE — ASSESSMENT & PLAN NOTE
For past month has been experiencing loose watery stools, no blood or pus , occasional cramping. Not on antibiotics in recent past. No change in appetite or weight loss.

## 2019-11-25 ENCOUNTER — TELEPHONE (OUTPATIENT)
Dept: MEDICAL GROUP | Facility: CLINIC | Age: 54
End: 2019-11-25

## 2019-11-25 NOTE — TELEPHONE ENCOUNTER
I called patient to ne her apt and she stated that she is out of her pain pills. She was hoping you could write a script and she will pick it up as she can't get into to see you until the 4th of December

## 2019-12-06 ENCOUNTER — OFFICE VISIT (OUTPATIENT)
Dept: MEDICAL GROUP | Facility: CLINIC | Age: 54
End: 2019-12-06
Payer: COMMERCIAL

## 2019-12-06 VITALS
OXYGEN SATURATION: 94 % | BODY MASS INDEX: 47.09 KG/M2 | RESPIRATION RATE: 14 BRPM | HEIGHT: 66 IN | WEIGHT: 293 LBS | TEMPERATURE: 98.5 F | DIASTOLIC BLOOD PRESSURE: 88 MMHG | SYSTOLIC BLOOD PRESSURE: 132 MMHG | HEART RATE: 88 BPM

## 2019-12-06 DIAGNOSIS — G89.29 ENCOUNTER FOR CHRONIC PAIN MANAGEMENT: ICD-10-CM

## 2019-12-06 DIAGNOSIS — M51.36 DDD (DEGENERATIVE DISC DISEASE), LUMBAR: ICD-10-CM

## 2019-12-06 DIAGNOSIS — Z79.891 CHRONIC USE OF OPIATE DRUG FOR THERAPEUTIC PURPOSE: ICD-10-CM

## 2019-12-06 PROCEDURE — 99213 OFFICE O/P EST LOW 20 MIN: CPT | Performed by: FAMILY MEDICINE

## 2019-12-06 RX ORDER — HYDROCODONE BITARTRATE AND ACETAMINOPHEN 10; 325 MG/1; MG/1
1 TABLET ORAL EVERY 8 HOURS PRN
Qty: 90 TAB | Refills: 0 | Status: SHIPPED | OUTPATIENT
Start: 2020-01-06 | End: 2019-12-06 | Stop reason: SDUPTHER

## 2019-12-06 RX ORDER — HYDROCODONE BITARTRATE AND ACETAMINOPHEN 10; 325 MG/1; MG/1
1-2 TABLET ORAL EVERY 6 HOURS PRN
COMMUNITY
End: 2019-12-06 | Stop reason: SDUPTHER

## 2019-12-06 RX ORDER — HYDROCODONE BITARTRATE AND ACETAMINOPHEN 10; 325 MG/1; MG/1
1 TABLET ORAL EVERY 8 HOURS PRN
Qty: 90 TAB | Refills: 0 | Status: SHIPPED | OUTPATIENT
Start: 2020-02-06 | End: 2020-02-24 | Stop reason: SDUPTHER

## 2019-12-06 RX ORDER — HYDROCODONE BITARTRATE AND ACETAMINOPHEN 10; 325 MG/1; MG/1
1 TABLET ORAL EVERY 8 HOURS PRN
Qty: 90 TAB | Refills: 0 | Status: SHIPPED | OUTPATIENT
Start: 2019-12-06 | End: 2019-12-06 | Stop reason: SDUPTHER

## 2019-12-06 NOTE — PROGRESS NOTES
Complaint: Refill Norco     Subjective:     Gail Elias is a 54 y.o. female here today accompanied by her .    Encounter for chronic pain management  Here for refill Norco 10 , ran out last week, couldn't make in to clinic. Usually gets 3 mos worth. Takes only as needed. Can't take more Lyrica during daytime due to sedation.     No other concerns or complaints today.    Current medicines (including changes today)  Current Outpatient Medications   Medication Sig Dispense Refill   • [START ON 2/6/2020] HYDROcodone/acetaminophen (NORCO)  MG Tab Take 1 Tab by mouth every 8 hours as needed for up to 30 days. 90 Tab 0   • pregabalin (LYRICA) 150 MG Cap Take 1 tab in AM, 2 at bedtime daily. 90 Cap 2   • levothyroxine (SYNTHROID) 75 MCG Tab Take 1 Tab by mouth Every morning on an empty stomach. In addition to 200 mcg tablet daily. 30 Tab 5   • sertraline (ZOLOFT) 100 MG Tab TAKE TWO TABLETS BY MOUTH DAILY 180 Tab 1   • traZODone (DESYREL) 50 MG Tab TAKE ONE TABLET BY MOUTH AT BEDTIME 90 Tab 1   • omeprazole (PRILOSEC) 20 MG delayed-release capsule Take 1 Cap by mouth every day. 90 Cap 1   • raNITidine (ZANTAC) 150 MG Tab TAKE TWO TABLETS BY MOUTH DAILY 180 Tab 1   • levothyroxine (SYNTHROID) 200 MCG Tab TAKE ONE TABLET BY MOUTH EVERY MORNING ON AN EMPTY STOMACH 30 Tab 5   • topiramate (TOPAMAX) 50 MG tablet TAKE ONE TABLET BY MOUTH DAILY 90 Tab 1   • ibuprofen (IBU) 800 MG Tab Take 1 Tab by mouth every 8 hours as needed. 270 Tab 0   • cyclobenzaprine (FLEXERIL) 10 MG Tab TAKE ONE TABLET BY MOUTH THREE TIMES A DAY AS NEEDED FOR MUSCLE SPASMS 270 Tab 1   • BIOTIN by Does not apply route.     • pioglitazone (ACTOS) 15 MG Tab Take 1 Tab by mouth every day. (Patient not taking: Reported on 12/6/2019) 30 Tab 5     No current facility-administered medications for this visit.      She  has a past medical history of Anxiety disorder (2/1/2011), Family history of diabetes mellitus (DM) (2/1/2011), GERD  (gastroesophageal reflux disease) (2/1/2011), Migraine headache (2/1/2011), and PCOS (polycystic ovarian syndrome) (2/1/2011).    Health Maintenance:       Allergies: Latex    Current Outpatient Medications Ordered in Epic   Medication Sig Dispense Refill   • [START ON 2/6/2020] HYDROcodone/acetaminophen (NORCO)  MG Tab Take 1 Tab by mouth every 8 hours as needed for up to 30 days. 90 Tab 0   • pregabalin (LYRICA) 150 MG Cap Take 1 tab in AM, 2 at bedtime daily. 90 Cap 2   • levothyroxine (SYNTHROID) 75 MCG Tab Take 1 Tab by mouth Every morning on an empty stomach. In addition to 200 mcg tablet daily. 30 Tab 5   • sertraline (ZOLOFT) 100 MG Tab TAKE TWO TABLETS BY MOUTH DAILY 180 Tab 1   • traZODone (DESYREL) 50 MG Tab TAKE ONE TABLET BY MOUTH AT BEDTIME 90 Tab 1   • omeprazole (PRILOSEC) 20 MG delayed-release capsule Take 1 Cap by mouth every day. 90 Cap 1   • raNITidine (ZANTAC) 150 MG Tab TAKE TWO TABLETS BY MOUTH DAILY 180 Tab 1   • levothyroxine (SYNTHROID) 200 MCG Tab TAKE ONE TABLET BY MOUTH EVERY MORNING ON AN EMPTY STOMACH 30 Tab 5   • topiramate (TOPAMAX) 50 MG tablet TAKE ONE TABLET BY MOUTH DAILY 90 Tab 1   • ibuprofen (IBU) 800 MG Tab Take 1 Tab by mouth every 8 hours as needed. 270 Tab 0   • cyclobenzaprine (FLEXERIL) 10 MG Tab TAKE ONE TABLET BY MOUTH THREE TIMES A DAY AS NEEDED FOR MUSCLE SPASMS 270 Tab 1   • BIOTIN by Does not apply route.     • pioglitazone (ACTOS) 15 MG Tab Take 1 Tab by mouth every day. (Patient not taking: Reported on 12/6/2019) 30 Tab 5     No current Epic-ordered facility-administered medications on file.        Past Medical History:   Diagnosis Date   • Anxiety disorder 2/1/2011   • Family history of diabetes mellitus (DM) 2/1/2011   • GERD (gastroesophageal reflux disease) 2/1/2011   • Migraine headache 2/1/2011   • PCOS (polycystic ovarian syndrome) 2/1/2011       Past Surgical History:   Procedure Laterality Date   • ABDOMINAL EXPLORATION     • CERVICAL DISK AND  "FUSION ANTERIOR     • LAPAROSCOPY     • LUMBAR LAMINECTOMY DISKECTOMY     • TONSILLECTOMY AND ADENOIDECTOMY         Social History     Tobacco Use   • Smoking status: Never Smoker   • Smokeless tobacco: Never Used   • Tobacco comment: avoid all tobacco products   Substance Use Topics   • Alcohol use: No     Alcohol/week: 0.0 oz     Comment: occasionally   • Drug use: No       Social History     Patient does not qualify to have social determinant information on file (likely too young).   Social History Narrative   • Not on file       Family History   Problem Relation Age of Onset   • Hyperlipidemia Mother    • Cancer Mother         ? vocal cord   • Other Mother         multi organ failure/cardiac arrest   • Diabetes Sister    • Hypertension Sister    • Hyperlipidemia Sister    • Diabetes Brother    • Hypertension Brother    • Hyperlipidemia Brother    • Heart Disease Maternal Aunt    • Heart Disease Maternal Grandmother    • Diabetes Paternal Grandmother    • Stroke Neg Hx          ROS Positive for muscles aches all over, back and neck pain.  Patient denies any fever, chills, unintentional weight gain/loss, fatigue, stroke symptoms, dizziness, headache, nasal congestion, sore-throat, cough, heartburn, chest pain, difficulty breathing, abdominal discomfort, diarrhea/constipation, burning with urination or frequency,  skin rashes, depression or anxiety.       Objective:     /88 (BP Location: Left arm, Patient Position: Sitting, BP Cuff Size: Large adult long)   Pulse 88   Temp 36.9 °C (98.5 °F) (Temporal)   Resp 14   Ht 1.676 m (5' 6\")   Wt (!) 139 kg (306 lb 6.4 oz)   SpO2 94%  Body mass index is 49.45 kg/m².   Physical Exam:  Constitutional: Alert, no distress. Comfortable.  Psych: Alert and oriented x3, appropriate affect and mood.        Assessment and Plan:   The following treatment plan was discussed    1. Encounter for chronic pain management/Chronic use of opiate drug for therapeutic purpose/DDD " (degenerative disc disease), lumbar  Narxcheck queried. Due. Stable on meds. Continue Lyrica.  - HYDROcodone/acetaminophen (NORCO)  MG Tab; Take 1 Tab by mouth every 8 hours as needed for up to 30 days.  Dispense: 90 Tab; Refill: 2 dated    Followup: Return in about 3 months (around 3/6/2020), or if symptoms worsen or fail to improve.    Please note that this dictation was created using voice recognition software. I have made every reasonable attempt to correct obvious errors, but I expect that there are errors of grammar and possibly content that I did not discover before finalizing the note.

## 2019-12-06 NOTE — ASSESSMENT & PLAN NOTE
Here for refill Norco 10 , ran out last week, couldn't make in to clinic. Usually gets 3 mos worth. Takes only as needed. Can't take more Lyrica during daytime due to sedation.

## 2020-02-04 DIAGNOSIS — K21.9 GASTROESOPHAGEAL REFLUX DISEASE WITHOUT ESOPHAGITIS: ICD-10-CM

## 2020-02-04 RX ORDER — OMEPRAZOLE 20 MG/1
20 CAPSULE, DELAYED RELEASE ORAL DAILY
Qty: 90 CAP | Refills: 1 | Status: SHIPPED | OUTPATIENT
Start: 2020-02-04 | End: 2020-10-09 | Stop reason: SDUPTHER

## 2020-02-04 RX ORDER — RANITIDINE 150 MG/1
TABLET ORAL
Qty: 180 TAB | Refills: 1 | Status: SHIPPED
Start: 2020-02-04 | End: 2020-05-06

## 2020-02-10 ENCOUNTER — PATIENT MESSAGE (OUTPATIENT)
Dept: HEALTH INFORMATION MANAGEMENT | Facility: OTHER | Age: 55
End: 2020-02-10

## 2020-02-12 ENCOUNTER — TELEPHONE (OUTPATIENT)
Dept: MEDICAL GROUP | Facility: CLINIC | Age: 55
End: 2020-02-12

## 2020-02-12 NOTE — TELEPHONE ENCOUNTER
Pt inquired about her next appointment info and if we carried the Measles booster and the Shingrex vaccine. Pt was informed of date and told that the Shingles vaccine was in stock.   Thank you

## 2020-02-13 DIAGNOSIS — Z23 NEED FOR VACCINATION: ICD-10-CM

## 2020-02-24 ENCOUNTER — OFFICE VISIT (OUTPATIENT)
Dept: MEDICAL GROUP | Facility: CLINIC | Age: 55
End: 2020-02-24
Payer: COMMERCIAL

## 2020-02-24 ENCOUNTER — HOSPITAL ENCOUNTER (OUTPATIENT)
Facility: MEDICAL CENTER | Age: 55
End: 2020-02-24
Attending: FAMILY MEDICINE
Payer: COMMERCIAL

## 2020-02-24 VITALS
HEIGHT: 66 IN | HEART RATE: 93 BPM | WEIGHT: 293 LBS | TEMPERATURE: 98 F | SYSTOLIC BLOOD PRESSURE: 132 MMHG | BODY MASS INDEX: 47.09 KG/M2 | OXYGEN SATURATION: 91 % | DIASTOLIC BLOOD PRESSURE: 86 MMHG | RESPIRATION RATE: 16 BRPM

## 2020-02-24 DIAGNOSIS — Z12.11 SCREENING FOR COLORECTAL CANCER: ICD-10-CM

## 2020-02-24 DIAGNOSIS — E11.9 TYPE 2 DIABETES MELLITUS WITHOUT COMPLICATION, WITHOUT LONG-TERM CURRENT USE OF INSULIN (HCC): ICD-10-CM

## 2020-02-24 DIAGNOSIS — Z12.12 SCREENING FOR COLORECTAL CANCER: ICD-10-CM

## 2020-02-24 DIAGNOSIS — Z79.891 CHRONIC USE OF OPIATE DRUG FOR THERAPEUTIC PURPOSE: ICD-10-CM

## 2020-02-24 DIAGNOSIS — E03.9 ACQUIRED HYPOTHYROIDISM: ICD-10-CM

## 2020-02-24 DIAGNOSIS — E66.01 MORBID OBESITY WITH BMI OF 45.0-49.9, ADULT (HCC): ICD-10-CM

## 2020-02-24 DIAGNOSIS — G89.29 ENCOUNTER FOR CHRONIC PAIN MANAGEMENT: ICD-10-CM

## 2020-02-24 PROCEDURE — 90471 IMMUNIZATION ADMIN: CPT | Performed by: FAMILY MEDICINE

## 2020-02-24 PROCEDURE — 82570 ASSAY OF URINE CREATININE: CPT

## 2020-02-24 PROCEDURE — 90750 HZV VACC RECOMBINANT IM: CPT | Performed by: FAMILY MEDICINE

## 2020-02-24 PROCEDURE — 82043 UR ALBUMIN QUANTITATIVE: CPT

## 2020-02-24 PROCEDURE — 85025 COMPLETE CBC W/AUTO DIFF WBC: CPT

## 2020-02-24 PROCEDURE — 83036 HEMOGLOBIN GLYCOSYLATED A1C: CPT

## 2020-02-24 PROCEDURE — 99214 OFFICE O/P EST MOD 30 MIN: CPT | Mod: 25 | Performed by: FAMILY MEDICINE

## 2020-02-24 PROCEDURE — 84439 ASSAY OF FREE THYROXINE: CPT

## 2020-02-24 PROCEDURE — 84443 ASSAY THYROID STIM HORMONE: CPT

## 2020-02-24 PROCEDURE — 90472 IMMUNIZATION ADMIN EACH ADD: CPT | Performed by: FAMILY MEDICINE

## 2020-02-24 PROCEDURE — 80053 COMPREHEN METABOLIC PANEL: CPT

## 2020-02-24 PROCEDURE — 90707 MMR VACCINE SC: CPT | Performed by: FAMILY MEDICINE

## 2020-02-24 RX ORDER — GLIMEPIRIDE 2 MG/1
2 TABLET ORAL EVERY MORNING
Qty: 90 TAB | Refills: 1 | Status: SHIPPED | OUTPATIENT
Start: 2020-02-24 | End: 2020-09-08 | Stop reason: SDUPTHER

## 2020-02-24 RX ORDER — PREGABALIN 100 MG/1
CAPSULE ORAL
Qty: 90 CAP | Refills: 2 | Status: SHIPPED | OUTPATIENT
Start: 2020-03-07 | End: 2020-03-24

## 2020-02-24 RX ORDER — HYDROCODONE BITARTRATE AND ACETAMINOPHEN 10; 325 MG/1; MG/1
1 TABLET ORAL EVERY 8 HOURS PRN
Qty: 90 TAB | Refills: 0 | Status: SHIPPED | OUTPATIENT
Start: 2020-04-06 | End: 2020-02-24 | Stop reason: SDUPTHER

## 2020-02-24 RX ORDER — HYDROCODONE BITARTRATE AND ACETAMINOPHEN 10; 325 MG/1; MG/1
1 TABLET ORAL EVERY 8 HOURS PRN
Qty: 90 TAB | Refills: 0 | Status: SHIPPED | OUTPATIENT
Start: 2020-05-06 | End: 2020-06-05

## 2020-02-24 RX ORDER — HYDROCODONE BITARTRATE AND ACETAMINOPHEN 10; 325 MG/1; MG/1
1 TABLET ORAL EVERY 8 HOURS PRN
Qty: 90 TAB | Refills: 0 | Status: SHIPPED | OUTPATIENT
Start: 2020-03-07 | End: 2020-02-24 | Stop reason: SDUPTHER

## 2020-02-24 NOTE — ASSESSMENT & PLAN NOTE
Did not take Actos, due to possible interaction with her other meds. Never had DM/nutrtional education. Last A1c was 7.6% Needs routine labs. Last FLP in 10/208 was well wnl, high HDL at 65.

## 2020-02-24 NOTE — PROGRESS NOTES
Complaint: Needs shots, review meds     Subjective:     Gail Elias is a 54 y.o. female here today accompanied by her .    Type 2 diabetes mellitus without complication, without long-term current use of insulin (HCC)  Did not take Actos, due to possible interaction with her other meds. Never had DM/nutrtional education. Last A1c was 7.6% Needs routine labs. Last FLP in 10/208 was well wnl, high HDL at 65.    Acquired hypothyroidism  Last TSH in 10/2018 was 15. Currently on 200 mch Synthroid/day.    Encounter for chronic pain management  Will be due on March 6th for refills of her Colora and Lyrica.    Morbid obesity with BMI of 45.0-49.9, adult (HCC)  Continues to lose weight     No other concerns or complaints today.    Current medicines (including changes today)  Current Outpatient Medications   Medication Sig Dispense Refill   • glimepiride (AMARYL) 2 MG Tab Take 1 Tab by mouth every morning. 90 Tab 1   • [START ON 3/7/2020] pregabalin (LYRICA) 100 MG Cap Take 1 tab in AM, 2 at bedtime daily 90 Cap 2   • [START ON 5/6/2020] HYDROcodone/acetaminophen (NORCO)  MG Tab Take 1 Tab by mouth every 8 hours as needed for Moderate Pain or Severe Pain for up to 30 days. 90 Tab 0   • LYRICA 150 MG Cap      • omeprazole (PRILOSEC) 20 MG delayed-release capsule Take 1 Cap by mouth every day. 90 Cap 1   • raNITidine (ZANTAC) 150 MG Tab TAKE TWO TABLETS BY MOUTH DAILY 180 Tab 1   • ibuprofen (MOTRIN) 800 MG Tab TAKE ONE TABLET BY MOUTH EVERY 8 HOURS AS NEEDED 270 Tab 0   • levothyroxine (SYNTHROID) 200 MCG Tab TAKE ONE TABLET BY MOUTH EVERY MORNING ON AN EMPTY STOMACH 30 Tab 5   • topiramate (TOPAMAX) 50 MG tablet TAKE ONE TABLET BY MOUTH DAILY 90 Tab 1   • pioglitazone (ACTOS) 15 MG Tab Take 1 Tab by mouth every day. (Patient not taking: Reported on 12/6/2019) 30 Tab 5   • levothyroxine (SYNTHROID) 75 MCG Tab Take 1 Tab by mouth Every morning on an empty stomach. In addition to 200 mcg tablet daily. 30 Tab 5    • sertraline (ZOLOFT) 100 MG Tab TAKE TWO TABLETS BY MOUTH DAILY 180 Tab 1   • traZODone (DESYREL) 50 MG Tab TAKE ONE TABLET BY MOUTH AT BEDTIME 90 Tab 1   • cyclobenzaprine (FLEXERIL) 10 MG Tab TAKE ONE TABLET BY MOUTH THREE TIMES A DAY AS NEEDED FOR MUSCLE SPASMS 270 Tab 1   • BIOTIN by Does not apply route.       No current facility-administered medications for this visit.      She  has a past medical history of Anxiety disorder (2/1/2011), Family history of diabetes mellitus (DM) (2/1/2011), GERD (gastroesophageal reflux disease) (2/1/2011), Migraine headache (2/1/2011), and PCOS (polycystic ovarian syndrome) (2/1/2011).    Health Maintenance: will self-refer to gyn, set up mammogram, wants referral for colonoscopy      Allergies: Latex    Current Outpatient Medications Ordered in Epic   Medication Sig Dispense Refill   • glimepiride (AMARYL) 2 MG Tab Take 1 Tab by mouth every morning. 90 Tab 1   • [START ON 3/7/2020] pregabalin (LYRICA) 100 MG Cap Take 1 tab in AM, 2 at bedtime daily 90 Cap 2   • [START ON 5/6/2020] HYDROcodone/acetaminophen (NORCO)  MG Tab Take 1 Tab by mouth every 8 hours as needed for Moderate Pain or Severe Pain for up to 30 days. 90 Tab 0   • LYRICA 150 MG Cap      • omeprazole (PRILOSEC) 20 MG delayed-release capsule Take 1 Cap by mouth every day. 90 Cap 1   • raNITidine (ZANTAC) 150 MG Tab TAKE TWO TABLETS BY MOUTH DAILY 180 Tab 1   • ibuprofen (MOTRIN) 800 MG Tab TAKE ONE TABLET BY MOUTH EVERY 8 HOURS AS NEEDED 270 Tab 0   • levothyroxine (SYNTHROID) 200 MCG Tab TAKE ONE TABLET BY MOUTH EVERY MORNING ON AN EMPTY STOMACH 30 Tab 5   • topiramate (TOPAMAX) 50 MG tablet TAKE ONE TABLET BY MOUTH DAILY 90 Tab 1   • pioglitazone (ACTOS) 15 MG Tab Take 1 Tab by mouth every day. (Patient not taking: Reported on 12/6/2019) 30 Tab 5   • levothyroxine (SYNTHROID) 75 MCG Tab Take 1 Tab by mouth Every morning on an empty stomach. In addition to 200 mcg tablet daily. 30 Tab 5   • sertraline  (ZOLOFT) 100 MG Tab TAKE TWO TABLETS BY MOUTH DAILY 180 Tab 1   • traZODone (DESYREL) 50 MG Tab TAKE ONE TABLET BY MOUTH AT BEDTIME 90 Tab 1   • cyclobenzaprine (FLEXERIL) 10 MG Tab TAKE ONE TABLET BY MOUTH THREE TIMES A DAY AS NEEDED FOR MUSCLE SPASMS 270 Tab 1   • BIOTIN by Does not apply route.       No current Epic-ordered facility-administered medications on file.        Past Medical History:   Diagnosis Date   • Anxiety disorder 2/1/2011   • Family history of diabetes mellitus (DM) 2/1/2011   • GERD (gastroesophageal reflux disease) 2/1/2011   • Migraine headache 2/1/2011   • PCOS (polycystic ovarian syndrome) 2/1/2011       Past Surgical History:   Procedure Laterality Date   • ABDOMINAL EXPLORATION     • CERVICAL DISK AND FUSION ANTERIOR     • LAPAROSCOPY     • LUMBAR LAMINECTOMY DISKECTOMY     • TONSILLECTOMY AND ADENOIDECTOMY         Social History     Tobacco Use   • Smoking status: Never Smoker   • Smokeless tobacco: Never Used   • Tobacco comment: avoid all tobacco products   Substance Use Topics   • Alcohol use: No     Alcohol/week: 0.0 oz     Comment: occasionally   • Drug use: No       Social History     Social History Narrative   • Not on file       Family History   Problem Relation Age of Onset   • Hyperlipidemia Mother    • Cancer Mother         ? vocal cord   • Other Mother         multi organ failure/cardiac arrest   • Diabetes Sister    • Hypertension Sister    • Hyperlipidemia Sister    • Diabetes Brother    • Hypertension Brother    • Hyperlipidemia Brother    • Heart Disease Maternal Aunt    • Heart Disease Maternal Grandmother    • Diabetes Paternal Grandmother    • Stroke Neg Hx          ROS Positive for low back pain, neck pain, generalized discomfort.  Patient denies any fever, chills, unintentional weight gain/loss, fatigue, stroke symptoms, dizziness, headache, nasal congestion, sore-throat, cough, heartburn, chest pain, difficulty breathing, abdominal discomfort,  "diarrhea/constipation, burning with urination or frequency, skin rashes, depression or anxiety.       Objective:     /86 (BP Location: Left arm, Patient Position: Sitting, BP Cuff Size: Large adult)   Pulse 93   Temp 36.7 °C (98 °F) (Temporal)   Resp 16   Ht 1.676 m (5' 6\")   Wt (!) 136.5 kg (301 lb)   SpO2 91%  Body mass index is 48.58 kg/m².   Physical Exam:  Constitutional: Alert, no distress.    Psych: Alert and oriented x3, appropriate affect and mood.        Assessment and Plan:   The following treatment plan was discussed    1. Type 2 diabetes mellitus without complication, without long-term current use of insulin (HCC)  Uncontrolled. Convinced patient to try another med. Does not need to check her BS. Will refer to diabetic education. Will need to start on low dose ACE at f/u.  - CBC WITH DIFFERENTIAL; Future  - Comp Metabolic Panel; Future  - MICROALBUMIN CREAT RATIO URINE; Future  - REFERRAL TO DIABETIC EDUCATION Diabetes Self Management Education / Training (DSME/T) and Medical Nutrition Therapy (MNT): Initial Group DSME/MNT as authorized by payor, Follow-Up DSME/MNT as authorized by payor; DSME/T Content: Monitoring Diabete...    2. Acquired hypothyroidism  Titrate to keep TSH between 1 and 2.  - TSH WITH REFLEX TO FT4; Future    3. Chronic use of opiate drug for therapeutic purpose/Encounter for chronic pain management    Unable to access [employer] Coffee[employer] today. Patient long-term stable on current regimen.  - pregabalin (LYRICA) 100 MG Cap; Take 1 tab in AM, 2 at bedtime daily  Dispense: 90 Cap; Refill: 2  - HYDROcodone/acetaminophen (NORCO)  MG Tab; Take 1 Tab by mouth every 8 hours as needed for Moderate Pain or Severe Pain for up to 30 days.  Dispense: 90 Tab; Refill: 0    4. Morbid obesity with BMI of 45.0-49.9, adult (HCC)  - Patient identified as having weight management issue.  Appropriate orders and counseling given.    5. Screening for colorectal cancer  - REFERRAL TO GI FOR " COLONOSCOPY        Followup: Return in about 3 months (around 5/24/2020), or if symptoms worsen or fail to improve, for DM, pain management.    Please note that this dictation was created using voice recognition software. I have made every reasonable attempt to correct obvious errors, but I expect that there are errors of grammar and possibly content that I did not discover before finalizing the note.

## 2020-02-25 LAB
ALBUMIN SERPL BCP-MCNC: 3.8 G/DL (ref 3.2–4.9)
ALBUMIN/GLOB SERPL: 1.3 G/DL
ALP SERPL-CCNC: 93 U/L (ref 30–99)
ALT SERPL-CCNC: 17 U/L (ref 2–50)
ANION GAP SERPL CALC-SCNC: 9 MMOL/L (ref 0–11.9)
AST SERPL-CCNC: 25 U/L (ref 12–45)
BASOPHILS # BLD AUTO: 0.5 % (ref 0–1.8)
BASOPHILS # BLD: 0.03 K/UL (ref 0–0.12)
BILIRUB SERPL-MCNC: 0.5 MG/DL (ref 0.1–1.5)
BUN SERPL-MCNC: 10 MG/DL (ref 8–22)
CALCIUM SERPL-MCNC: 9.4 MG/DL (ref 8.5–10.5)
CHLORIDE SERPL-SCNC: 106 MMOL/L (ref 96–112)
CO2 SERPL-SCNC: 26 MMOL/L (ref 20–33)
CREAT SERPL-MCNC: 0.79 MG/DL (ref 0.5–1.4)
CREAT UR-MCNC: 47.6 MG/DL
EOSINOPHIL # BLD AUTO: 0.16 K/UL (ref 0–0.51)
EOSINOPHIL NFR BLD: 2.4 % (ref 0–6.9)
ERYTHROCYTE [DISTWIDTH] IN BLOOD BY AUTOMATED COUNT: 43.4 FL (ref 35.9–50)
EST. AVERAGE GLUCOSE BLD GHB EST-MCNC: 263 MG/DL
GLOBULIN SER CALC-MCNC: 2.9 G/DL (ref 1.9–3.5)
GLUCOSE SERPL-MCNC: 248 MG/DL (ref 65–99)
HBA1C MFR BLD: 10.8 % (ref 0–5.6)
HCT VFR BLD AUTO: 42.4 % (ref 37–47)
HGB BLD-MCNC: 14.2 G/DL (ref 12–16)
IMM GRANULOCYTES # BLD AUTO: 0.01 K/UL (ref 0–0.11)
IMM GRANULOCYTES NFR BLD AUTO: 0.2 % (ref 0–0.9)
LYMPHOCYTES # BLD AUTO: 2.56 K/UL (ref 1–4.8)
LYMPHOCYTES NFR BLD: 38.9 % (ref 22–41)
MCH RBC QN AUTO: 29.8 PG (ref 27–33)
MCHC RBC AUTO-ENTMCNC: 33.5 G/DL (ref 33.6–35)
MCV RBC AUTO: 89.1 FL (ref 81.4–97.8)
MICROALBUMIN UR-MCNC: <0.7 MG/DL
MICROALBUMIN/CREAT UR: NORMAL MG/G (ref 0–30)
MONOCYTES # BLD AUTO: 0.44 K/UL (ref 0–0.85)
MONOCYTES NFR BLD AUTO: 6.7 % (ref 0–13.4)
NEUTROPHILS # BLD AUTO: 3.38 K/UL (ref 2–7.15)
NEUTROPHILS NFR BLD: 51.3 % (ref 44–72)
NRBC # BLD AUTO: 0 K/UL
NRBC BLD-RTO: 0 /100 WBC
PLATELET # BLD AUTO: 188 K/UL (ref 164–446)
PMV BLD AUTO: 11.8 FL (ref 9–12.9)
POTASSIUM SERPL-SCNC: 4.2 MMOL/L (ref 3.6–5.5)
PROT SERPL-MCNC: 6.7 G/DL (ref 6–8.2)
RBC # BLD AUTO: 4.76 M/UL (ref 4.2–5.4)
SODIUM SERPL-SCNC: 141 MMOL/L (ref 135–145)
T4 FREE SERPL-MCNC: 3.34 NG/DL (ref 0.53–1.43)
TSH SERPL DL<=0.005 MIU/L-ACNC: 0.01 UIU/ML (ref 0.38–5.33)
WBC # BLD AUTO: 6.6 K/UL (ref 4.8–10.8)

## 2020-02-26 ENCOUNTER — TELEPHONE (OUTPATIENT)
Dept: MEDICAL GROUP | Facility: CLINIC | Age: 55
End: 2020-02-26

## 2020-02-27 NOTE — TELEPHONE ENCOUNTER
"  Physical Therapy Daily Treatment     Visit Count: 5  Plan of Care Dates: Initial: 5/10/2018 Through: 8/2/2018  Insurance Information:   THERAPY BENEFITS:  PAYOR:Â [employer]  VISIT LIMIT:Â 20 VISITS PER CALENDAR YEAR, HARD MAXIMUM  AUTHORIZATION NEEDED:Â NO  Â   Paid through: 05/31/18  DEDUCTIBLE:Â $ 25 [id number] Â Â Â Â Â Â Â Â Â Â Â Â Â MET:Â $ 1253  OUT OF POCKET:Â $ 8921 Â Â Â Â Â Â MET:Â $ 18  COINSURANCE:Â 20%  COPAY:Â $0  Next Referring Provider Visit: unknown  Â   Referred by: Alba Espino MD  Medical Diagnosis (from order)Left knee pain, unspecified chronicity [M25.562] :    Treatment Diagnosis: Knee Symptoms with Pain, Impaired Posture, Impaired Joint Mobility, Impaired Range of Motion, Impaired Gait/Locomotion Deficits, Impaired Mobility and Impaired Balance  Insurance: 1. [employer] COOPERATIVE EXCHANGE  2. N/A  Â   Date of Surgery: 4/25/18: ; Surgery performed: left TKR; Physician Guidelines: yes  Diagnosis Precautions: none  Chart reviewed: Relevant co-morbidities, allergies, tests and medications: right knee scope , right TKR 2 yrs ago. Â     SUBJECTIVE   Reports continued severe pain in the R knee, left knee reported as ""feeling ok\"". Pain today is 100% reported in the right calf, no pain behind posterior knee or R ankle reported. Current Pain: 9/10 right calf and limited R knee mobilityÂ   Functional Change: decreased mobility due to R knee and R calf pain. OBJECTIVE     Range of Motion (degrees) Norm Left Right   Date Â  Initial Initial   Knee Flexion 135  NT NT   Knee Extension 0-5 NT NT   standard testing positions unless otherwise noted; Key: ranges are reported in active range of motion unless noted as AA=active assistive or P=passive range of motion, * denotes pain   Comments: hip AB = NT, extension = NT      Palpation:  Tender right calf , some warmth noted, left calf shiny      SPECIAL TESTS  + Homans Sign Right        Treatment   Manual Therapy:  Gentle effleurage to anterior R LE to move edema proximally.   " Telcom with patient. Reviewed recent lab test results. Needs to start Amaryl. Needs to stop Synthroid 75mcg/day.  dg with Afib.   Pressure used not even enough to shoshana a fungertip due to concern for possible DVT R calf. Writer contacted Dr. Zonia Gordon, spoke with Beena Zayas his RN requesting a stat order of right a doppler US to the right LE to rule out a possible DVT due to positive Keyon's signs as well as observation of R lower limb. Dr. Zonia Gordon entered order and pt transported to imaging. Payal RN, reported pt could return home after US and she would be contacted with results. If + they would have Estefany Santillan follow up with her primary care physician. Estefanyzenobia Proctore verbalized understanding. Due to wait for imaging Estefany Santillan was offered ice without compression to the R LE until she was able to be seen in imaging. Vasopneumatic Compression / Game Ready ([id number]):   Patient has been made aware of potential contraindications and possible risks associated with the use of modality and has agreed. Location: left knee  Position: supine Compression: none this dateWater temperature 55 Â°F  Duration: 15 minutes   Results: no change in symptoms immediately following modality; no adverse reaction to treatment      Home Exercise Program:  Hold pending results of doppler US  Â   Â   ASSESSMENT   Pt would benefit from doppler US to R LE to rule out DVT. Writer contacted Dr. Zonia Gordon and Beena Zayas who placed order. Hold therapy pending results of doppler US R LE. Result of above outlined education: Verbalizes understanding, Demonstrates understanding and Needs reinforcement    Goals:       See initial      PLAN   Hold therapy pending results of doppler US R LE. Continue PT per POC when cleared of DVT post US.      THERAPY DAILY BILLING   Primary Insurance:  [employer]  Secondary Insurance: N/A    Evaluation Procedures:  No evaluation codes were used on this date of service    Timed Procedures:  Manual Therapy, 10 minutes    Untimed Procedures:  Hot/Cold Pack Therapy    Total Treatment Time: 50 minutes        The referring provider's electronic or written signature on the evaluation authorizes the therapy plan of care and certifies the need for these services, furnished under this plan of care while under their care. Physician Signature on file.

## 2020-03-23 ENCOUNTER — TELEPHONE (OUTPATIENT)
Dept: MEDICAL GROUP | Facility: CLINIC | Age: 55
End: 2020-03-23

## 2020-03-23 NOTE — TELEPHONE ENCOUNTER
VOICEMAIL  1. Caller Name: Cesar                      Call Back Number:     2. Message: Pt normally takes 150mg Lycria rx was for 100mg which one do you want filled? Please advise     3. Patient approves office to leave a detailed voicemail/MyChart message: N\A

## 2020-03-24 DIAGNOSIS — M79.7 FIBROMYALGIA: ICD-10-CM

## 2020-05-06 DIAGNOSIS — M47.812 SPONDYLOSIS OF CERVICAL REGION WITHOUT MYELOPATHY OR RADICULOPATHY: ICD-10-CM

## 2020-05-06 DIAGNOSIS — M79.7 FIBROMYALGIA: ICD-10-CM

## 2020-05-06 DIAGNOSIS — G89.29 ENCOUNTER FOR CHRONIC PAIN MANAGEMENT: ICD-10-CM

## 2020-05-06 DIAGNOSIS — K21.9 GASTROESOPHAGEAL REFLUX DISEASE WITHOUT ESOPHAGITIS: ICD-10-CM

## 2020-05-06 DIAGNOSIS — Z79.891 CHRONIC USE OF OPIATE DRUG FOR THERAPEUTIC PURPOSE: ICD-10-CM

## 2020-05-06 DIAGNOSIS — M51.36 DDD (DEGENERATIVE DISC DISEASE), LUMBAR: ICD-10-CM

## 2020-05-06 RX ORDER — FAMOTIDINE 40 MG/1
40 TABLET, FILM COATED ORAL DAILY
Qty: 90 TAB | Refills: 0 | Status: SHIPPED | OUTPATIENT
Start: 2020-05-06 | End: 2020-08-17 | Stop reason: SDUPTHER

## 2020-06-30 SDOH — ECONOMIC STABILITY: HOUSING INSECURITY: IN THE LAST 12 MONTHS, HOW MANY PLACES HAVE YOU LIVED?: 1

## 2020-06-30 SDOH — HEALTH STABILITY: MENTAL HEALTH
STRESS IS WHEN SOMEONE FEELS TENSE, NERVOUS, ANXIOUS, OR CAN'T SLEEP AT NIGHT BECAUSE THEIR MIND IS TROUBLED. HOW STRESSED ARE YOU?: TO SOME EXTENT

## 2020-06-30 SDOH — ECONOMIC STABILITY: HOUSING INSECURITY
IN THE LAST 12 MONTHS, WAS THERE A TIME WHEN YOU DID NOT HAVE A STEADY PLACE TO SLEEP OR SLEPT IN A SHELTER (INCLUDING NOW)?: PATIENT REFUSED

## 2020-06-30 SDOH — ECONOMIC STABILITY: TRANSPORTATION INSECURITY
IN THE PAST 12 MONTHS, HAS LACK OF RELIABLE TRANSPORTATION KEPT YOU FROM MEDICAL APPOINTMENTS, MEETINGS, WORK OR FROM GETTING THINGS NEEDED FOR DAILY LIVING?: NO

## 2020-06-30 SDOH — ECONOMIC STABILITY: HOUSING INSECURITY
IN THE LAST 12 MONTHS, WAS THERE A TIME WHEN YOU DID NOT HAVE A STEADY PLACE TO SLEEP OR SLEPT IN A SHELTER (INCLUDING NOW)?: NO

## 2020-06-30 SDOH — ECONOMIC STABILITY: INCOME INSECURITY: IN THE LAST 12 MONTHS, WAS THERE A TIME WHEN YOU WERE NOT ABLE TO PAY THE MORTGAGE OR RENT ON TIME?: PATIENT REFUSED

## 2020-06-30 SDOH — ECONOMIC STABILITY: TRANSPORTATION INSECURITY
IN THE PAST 12 MONTHS, HAS THE LACK OF TRANSPORTATION KEPT YOU FROM MEDICAL APPOINTMENTS OR FROM GETTING MEDICATIONS?: NO

## 2020-06-30 SDOH — HEALTH STABILITY: PHYSICAL HEALTH: ON AVERAGE, HOW MANY MINUTES DO YOU ENGAGE IN EXERCISE AT THIS LEVEL?: 120 MINUTES

## 2020-06-30 SDOH — HEALTH STABILITY: PHYSICAL HEALTH: ON AVERAGE, HOW MANY DAYS PER WEEK DO YOU ENGAGE IN MODERATE TO STRENUOUS EXERCISE (LIKE A BRISK WALK)?: 2 DAYS

## 2020-06-30 ASSESSMENT — SOCIAL DETERMINANTS OF HEALTH (SDOH)
HOW OFTEN DO YOU ATTEND CHURCH OR RELIGIOUS SERVICES?: NEVER
HOW OFTEN DO YOU HAVE SIX OR MORE DRINKS ON ONE OCCASION: NEVER
WITHIN THE PAST 12 MONTHS, YOU WORRIED THAT YOUR FOOD WOULD RUN OUT BEFORE YOU GOT THE MONEY TO BUY MORE: DECLINE
IN A TYPICAL WEEK, HOW MANY TIMES DO YOU TALK ON THE PHONE WITH FAMILY, FRIENDS, OR NEIGHBORS?: ONCE A WEEK
HOW HARD IS IT FOR YOU TO PAY FOR THE VERY BASICS LIKE FOOD, HOUSING, MEDICAL CARE, AND HEATING?: SOMEWHAT HARD
HOW OFTEN DO YOU HAVE A DRINK CONTAINING ALCOHOL: NEVER
DO YOU BELONG TO ANY CLUBS OR ORGANIZATIONS SUCH AS CHURCH GROUPS UNIONS, FRATERNAL OR ATHLETIC GROUPS, OR SCHOOL GROUPS?: NO
WITHIN THE PAST 12 MONTHS, THE FOOD YOU BOUGHT JUST DIDN'T LAST AND YOU DIDN'T HAVE MONEY TO GET MORE: DECLINE
HOW OFTEN DO YOU GET TOGETHER WITH FRIENDS OR RELATIVES?: NEVER
HOW OFTEN DO YOU ATTENT MEETINGS OF THE CLUB OR ORGANIZATION YOU BELONG TO?: NEVER

## 2020-07-06 ENCOUNTER — OFFICE VISIT (OUTPATIENT)
Dept: MEDICAL GROUP | Facility: PHYSICIAN GROUP | Age: 55
End: 2020-07-06
Payer: COMMERCIAL

## 2020-07-06 VITALS
SYSTOLIC BLOOD PRESSURE: 160 MMHG | BODY MASS INDEX: 47.09 KG/M2 | HEIGHT: 66 IN | WEIGHT: 293 LBS | RESPIRATION RATE: 18 BRPM | DIASTOLIC BLOOD PRESSURE: 98 MMHG | HEART RATE: 95 BPM | OXYGEN SATURATION: 90 % | TEMPERATURE: 98 F

## 2020-07-06 DIAGNOSIS — Z12.31 ENCOUNTER FOR SCREENING MAMMOGRAM FOR BREAST CANCER: ICD-10-CM

## 2020-07-06 DIAGNOSIS — Z12.11 COLON CANCER SCREENING: ICD-10-CM

## 2020-07-06 DIAGNOSIS — G43.809 OTHER MIGRAINE WITHOUT STATUS MIGRAINOSUS, NOT INTRACTABLE: Chronic | ICD-10-CM

## 2020-07-06 DIAGNOSIS — M51.36 DDD (DEGENERATIVE DISC DISEASE), LUMBAR: ICD-10-CM

## 2020-07-06 DIAGNOSIS — E11.9 TYPE 2 DIABETES MELLITUS WITHOUT COMPLICATION, WITHOUT LONG-TERM CURRENT USE OF INSULIN (HCC): Chronic | ICD-10-CM

## 2020-07-06 DIAGNOSIS — F41.9 ANXIETY: Chronic | ICD-10-CM

## 2020-07-06 DIAGNOSIS — E03.9 ACQUIRED HYPOTHYROIDISM: Chronic | ICD-10-CM

## 2020-07-06 DIAGNOSIS — Z79.891 CHRONIC USE OF OPIATE DRUG FOR THERAPEUTIC PURPOSE: Chronic | ICD-10-CM

## 2020-07-06 DIAGNOSIS — M79.7 FIBROMYALGIA: Chronic | ICD-10-CM

## 2020-07-06 PROBLEM — M51.369 DDD (DEGENERATIVE DISC DISEASE), LUMBAR: Chronic | Status: ACTIVE | Noted: 2017-09-26

## 2020-07-06 PROBLEM — R19.7 DIARRHEA: Status: RESOLVED | Noted: 2019-10-02 | Resolved: 2020-07-06

## 2020-07-06 PROBLEM — G89.29 ENCOUNTER FOR CHRONIC PAIN MANAGEMENT: Status: RESOLVED | Noted: 2019-10-02 | Resolved: 2020-07-06

## 2020-07-06 PROBLEM — G47.00 INSOMNIA: Status: ACTIVE | Noted: 2020-07-06

## 2020-07-06 PROCEDURE — 99214 OFFICE O/P EST MOD 30 MIN: CPT | Performed by: INTERNAL MEDICINE

## 2020-07-06 RX ORDER — HYDROCODONE BITARTRATE AND ACETAMINOPHEN 10; 325 MG/1; MG/1
TABLET ORAL
COMMUNITY
Start: 2020-07-01 | End: 2023-04-10

## 2020-07-06 ASSESSMENT — FIBROSIS 4 INDEX: FIB4 SCORE: 1.74

## 2020-07-06 ASSESSMENT — PATIENT HEALTH QUESTIONNAIRE - PHQ9: CLINICAL INTERPRETATION OF PHQ2 SCORE: 0

## 2020-07-06 NOTE — ASSESSMENT & PLAN NOTE
Is a chronic condition.  The patient is presently taking glimepiride the patient denies any symptoms of hypoglycemia.  Patient is due for lab work.  Previous A1c in February 2020 was high over 10%.

## 2020-07-06 NOTE — ASSESSMENT & PLAN NOTE
This is a chronic condition but the patient is now followed by the pain specialist.  She is taking several medication including hydrocodone and Lyrica.

## 2020-07-06 NOTE — ASSESSMENT & PLAN NOTE
Patient is currently followed by pain specialist she is taking hydrocodone and Flexeril.  Per patient report condition is stable.  Patient reported pain in her neck back as well as fibromyalgia.

## 2020-07-06 NOTE — ASSESSMENT & PLAN NOTE
This is a chronic condition the patient is a now taking sertraline.  Condition is stable.  Patient stated that she has been under significant amount of stress recently as her  recently had a heart attack with other medical conditions.

## 2020-07-06 NOTE — ASSESSMENT & PLAN NOTE
Chronic condition.  The patient is presently taking Topamax.  She take ibuprofen as needed and hydrocodone for severe pain.  No side effect reported.

## 2020-07-06 NOTE — PROGRESS NOTES
CC: Establish care  Follow-up diabetes      HPI: 54 y.o. Patient presents to discuss the following:     Type 2 diabetes mellitus without complication, without long-term current use of insulin (HCC)  Is a chronic condition.  The patient is presently taking glimepiride the patient denies any symptoms of hypoglycemia.  Patient is due for lab work.  Previous A1c in February 2020 was high over 10%.    Fibromyalgia  This is a chronic condition but the patient is now followed by the pain specialist.  She is taking several medication including hydrocodone and Lyrica.    Other migraine without status migrainosus, not intractable  Chronic condition.  The patient is presently taking Topamax.  She take ibuprofen as needed and hydrocodone for severe pain.  No side effect reported.    Acquired hypothyroidism  Chronic condition.  Currently on levothyroxine.  The patient is due for lab work.    Chronic use of opiate drug for therapeutic purpose  Patient is currently followed by pain specialist she is taking hydrocodone and Flexeril.  Per patient report condition is stable.  Patient reported pain in her neck back as well as fibromyalgia.    Anxiety  This is a chronic condition the patient is a now taking sertraline.  Condition is stable.  Patient stated that she has been under significant amount of stress recently as her  recently had a heart attack with other medical conditions.          REVIEW OF SYSTEMS:     Constitutional:  no fever / chills   Neurologic: no headaches, no numbness/tingling  Eyes: no changes in vision  ENT: no sore throat, no hearing loss  CV:  no chest pain, no palpitations  Pulmonary: no SOB, no cough    GI: no nausea / vomiting, no diarrhea, no constipation  :  no dysuria, no hematuria   Skin: no rash  Hematologic: no bleeding      Allergies: Latex    Current Outpatient Medications Ordered in Epic   Medication Sig Dispense Refill   • HYDROcodone/acetaminophen (NORCO)  MG Tab      • LYRICA 100 MG  Cap      • sertraline (ZOLOFT) 100 MG Tab TAKE TWO TABLETS BY MOUTH DAILY - ZOLOFT 180 Tab 0   • traZODone (DESYREL) 50 MG Tab TAKE ONE TABLET BY MOUTH EVERY NIGHT AT BEDTIME - DESYREL 90 Tab 0   • topiramate (TOPAMAX) 50 MG tablet TAKE ONE TABLET BY MOUTH DAILY 90 Tab 0   • famotidine (PEPCID) 40 MG Tab Take 1 Tab by mouth every day. 90 Tab 0   • glimepiride (AMARYL) 2 MG Tab Take 1 Tab by mouth every morning. 90 Tab 1   • omeprazole (PRILOSEC) 20 MG delayed-release capsule Take 1 Cap by mouth every day. 90 Cap 1   • ibuprofen (MOTRIN) 800 MG Tab TAKE ONE TABLET BY MOUTH EVERY 8 HOURS AS NEEDED 270 Tab 0   • levothyroxine (SYNTHROID) 200 MCG Tab TAKE ONE TABLET BY MOUTH EVERY MORNING ON AN EMPTY STOMACH 30 Tab 5   • cyclobenzaprine (FLEXERIL) 10 MG Tab TAKE ONE TABLET BY MOUTH THREE TIMES A DAY AS NEEDED FOR MUSCLE SPASMS 270 Tab 1   • BIOTIN by Does not apply route.       No current [facility]-ordered facility-administered medications on file.        Past Medical History:   Diagnosis Date   • Anxiety disorder 2/1/2011   • Family history of diabetes mellitus (DM) 2/1/2011   • GERD (gastroesophageal reflux disease) 2/1/2011   • Migraine headache 2/1/2011   • PCOS (polycystic ovarian syndrome) 2/1/2011        Past Surgical History:   Procedure Laterality Date   • ABDOMINAL EXPLORATION     • CERVICAL DISK AND FUSION ANTERIOR     • LAPAROSCOPY     • LUMBAR LAMINECTOMY DISKECTOMY     • TONSILLECTOMY AND ADENOIDECTOMY          Family History   Problem Relation Age of Onset   • Hyperlipidemia Mother    • Cancer Mother         ? vocal cord   • Other Mother         multi organ failure/cardiac arrest   • Diabetes Sister    • Hypertension Sister    • Hyperlipidemia Sister    • Diabetes Brother    • Hypertension Brother    • Hyperlipidemia Brother    • Heart Disease Maternal Aunt    • Heart Disease Maternal Grandmother    • Diabetes Paternal Grandmother    • Stroke Neg Hx         Social History     Tobacco Use   Smoking Status Never  Smoker   Smokeless Tobacco Never Used   Tobacco Comment    avoid all tobacco products          Social History     Substance and Sexual Activity   Alcohol Use No   • Alcohol/week: 0.0 oz   • Frequency: Never   • Binge frequency: Never    Comment: occasionally        ---------------------------------------------------------------------     PHYSICAL EXAM:   Vitals:    07/06/20 1326   BP: 160/98   Pulse: 95   Resp: 18   Temp: 36.7 °C (98 °F)   SpO2: 90%      Body mass index is 47.78 kg/m².        Constitutional: no acute distress  Eyes: PERRL, EOMI  Ears/nose/mouth: OP no exudates  Neck: supple, no JVD  CV: heart RRR  Resp: normal effort, no wheezing or rales.  GI: abdomen soft, no obvious mass, no tenderness  Neuro: CN 2-12 grossly intact  Skin: no obvious rash noted        ---------------------------------------------------------------------     ASSESSMENT and PLAN:  1. Encounter for screening mammogram for breast cancer    - MA-SCREENING MAMMO BILAT W/CAD; Future    2. DDD (degenerative disc disease), lumbar  3. Chronic use of opiate drug for therapeutic purpose      Chronic condition stable.  Continue with current management.    Continue follow-up with pain specialist as directed.        4. Anxiety  Stable condition.  Continue with sertraline.  No indication for mental health referral.    5. Type 2 diabetes mellitus without complication, without long-term current use of insulin (HCC)  Chronic condition.  Control is unclear.  Lab tests ordered.  A1c goal of 7% discussed w pt.  Advised lifestyle modification.  Stressed the importance of low sweet /low carb diet, high lean protein, and avoid unhealthy foods.  Encourage pt to start/continue with regular exercises/walking.   - HEMOGLOBIN A1C; Future  - ALANINE AMINO-TRANS; Future  - Basic Metabolic Panel; Future  - CBC WITH DIFFERENTIAL; Future  - Lipid Profile; Future  - TSH; Future  - MICROALBUMIN CREAT RATIO URINE; Future    6. Colon cancer screening  Strongly  recommend colonoscopy to be done the patient declined.  - COLOGUARD (FIT DNA)    7. Fibromyalgia  Chronic stable condition.  Continue with current management.  Continue follow-up with pain specialist as directed.    8. Other migraine without status migrainosus, not intractable  Chronic condition, stable.  Currently the patient asymptomatic.  Continue with Topamax.    9. Acquired hypothyroidism  Chronic condition.  Control is unclear.  Lab tests ordered.            Return in about 6 months (around 1/6/2021) for diabetes followup.       PATIENT EDUCATION:  -If any problems should arise, patient was advised to contact our office or go to ER to be evaluated.  -Advised pt to follow a healthy diet and regular aerobic exercise regimen. Advised pt to avoid alcohol and tobacco use.    Please note that this dictation was created using voice recognition software. I have made every reasonable attempt to correct obvious errors, but it is possible there are errors of grammar and possibly content that I did not discover before finalizing the note.

## 2020-08-04 RX ORDER — LEVOTHYROXINE SODIUM 0.2 MG/1
TABLET ORAL
Qty: 90 TAB | Refills: 0 | Status: SHIPPED | OUTPATIENT
Start: 2020-08-04 | End: 2021-10-20 | Stop reason: SDUPTHER

## 2020-08-04 NOTE — TELEPHONE ENCOUNTER
Was the patient seen in the last year in this department? Yes    Does patient have an active prescription for medications requested? Yes    Received Request Via: Pharmacy    Hospital Outpatient Visit on 02/24/2020   Component Date Value   • WBC 02/24/2020 6.6    • RBC 02/24/2020 4.76    • Hemoglobin 02/24/2020 14.2    • Hematocrit 02/24/2020 42.4    • MCV 02/24/2020 89.1    • MCH 02/24/2020 29.8    • MCHC 02/24/2020 33.5*   • RDW 02/24/2020 43.4    • Platelet Count 02/24/2020 188    • MPV 02/24/2020 11.8    • Neutrophils-Polys 02/24/2020 51.30    • Lymphocytes 02/24/2020 38.90    • Monocytes 02/24/2020 6.70    • Eosinophils 02/24/2020 2.40    • Basophils 02/24/2020 0.50    • Immature Granulocytes 02/24/2020 0.20    • Nucleated RBC 02/24/2020 0.00    • Neutrophils (Absolute) 02/24/2020 3.38    • Lymphs (Absolute) 02/24/2020 2.56    • Monos (Absolute) 02/24/2020 0.44    • Eos (Absolute) 02/24/2020 0.16    • Baso (Absolute) 02/24/2020 0.03    • Immature Granulocytes (a* 02/24/2020 0.01    • NRBC (Absolute) 02/24/2020 0.00    • Sodium 02/24/2020 141    • Potassium 02/24/2020 4.2    • Chloride 02/24/2020 106    • Co2 02/24/2020 26    • Anion Gap 02/24/2020 9.0    • Glucose 02/24/2020 248*   • Bun 02/24/2020 10    • Creatinine 02/24/2020 0.79    • Calcium 02/24/2020 9.4    • AST(SGOT) 02/24/2020 25    • ALT(SGPT) 02/24/2020 17    • Alkaline Phosphatase 02/24/2020 93    • Total Bilirubin 02/24/2020 0.5    • Albumin 02/24/2020 3.8    • Total Protein 02/24/2020 6.7    • Globulin 02/24/2020 2.9    • A-G Ratio 02/24/2020 1.3    • TSH 02/24/2020 0.010*   • Creatinine, Urine 02/24/2020 47.60    • Microalbumin, Urine Rand* 02/24/2020 <0.7    • Micro Alb Creat Ratio 02/24/2020 see below    • Glycohemoglobin 02/24/2020 10.8*   • Est Avg Glucose 02/24/2020 263    • Free T-4 02/24/2020 3.34*   • GFR If  02/24/2020 >60    • GFR If Non  Ameri* 02/24/2020 >60    ]

## 2020-08-17 DIAGNOSIS — K21.9 GASTROESOPHAGEAL REFLUX DISEASE WITHOUT ESOPHAGITIS: ICD-10-CM

## 2020-08-17 RX ORDER — FAMOTIDINE 40 MG/1
40 TABLET, FILM COATED ORAL DAILY
Qty: 90 TAB | Refills: 0 | Status: SHIPPED | OUTPATIENT
Start: 2020-08-17 | End: 2020-11-30 | Stop reason: SDUPTHER

## 2020-09-08 RX ORDER — GLIMEPIRIDE 2 MG/1
2 TABLET ORAL EVERY MORNING
Qty: 30 TAB | Refills: 1 | Status: SHIPPED | OUTPATIENT
Start: 2020-09-08 | End: 2021-09-14 | Stop reason: SDUPTHER

## 2020-09-29 DIAGNOSIS — F32.5 MAJOR DEPRESSION IN REMISSION (HCC): ICD-10-CM

## 2020-09-29 RX ORDER — TRAZODONE HYDROCHLORIDE 50 MG/1
TABLET ORAL
Qty: 90 TAB | Refills: 0 | Status: SHIPPED | OUTPATIENT
Start: 2020-09-29 | End: 2021-01-05

## 2020-09-29 RX ORDER — TOPIRAMATE 50 MG/1
TABLET, FILM COATED ORAL
Qty: 90 TAB | Refills: 0 | Status: SHIPPED | OUTPATIENT
Start: 2020-09-29 | End: 2021-01-05

## 2020-10-01 DIAGNOSIS — F32.5 MAJOR DEPRESSION IN REMISSION (HCC): ICD-10-CM

## 2020-10-02 RX ORDER — SERTRALINE HYDROCHLORIDE 100 MG/1
TABLET, FILM COATED ORAL
Qty: 180 TAB | Refills: 0 | Status: SHIPPED
Start: 2020-10-02 | End: 2021-12-29 | Stop reason: SDUPTHER

## 2020-10-09 RX ORDER — OMEPRAZOLE 20 MG/1
20 CAPSULE, DELAYED RELEASE ORAL DAILY
Qty: 90 CAP | Refills: 1 | Status: SHIPPED | OUTPATIENT
Start: 2020-10-09 | End: 2021-04-12

## 2020-10-09 NOTE — TELEPHONE ENCOUNTER
Last seen by PCP 7/6/2020. Not written by any providers at this facility. Will forward for refills.

## 2020-11-30 DIAGNOSIS — K21.9 GASTROESOPHAGEAL REFLUX DISEASE WITHOUT ESOPHAGITIS: ICD-10-CM

## 2020-11-30 RX ORDER — FAMOTIDINE 40 MG/1
40 TABLET, FILM COATED ORAL DAILY
Qty: 90 TAB | Refills: 0 | Status: SHIPPED | OUTPATIENT
Start: 2020-11-30 | End: 2021-03-03

## 2021-01-01 DIAGNOSIS — F32.5 MAJOR DEPRESSION IN REMISSION (HCC): ICD-10-CM

## 2021-01-05 RX ORDER — TOPIRAMATE 50 MG/1
TABLET, FILM COATED ORAL
Qty: 90 TAB | Refills: 1 | Status: SHIPPED | OUTPATIENT
Start: 2021-01-05 | End: 2021-07-15

## 2021-01-05 RX ORDER — TRAZODONE HYDROCHLORIDE 50 MG/1
TABLET ORAL
Qty: 90 TAB | Refills: 1 | Status: SHIPPED | OUTPATIENT
Start: 2021-01-05 | End: 2021-07-15

## 2021-02-28 DIAGNOSIS — K21.9 GASTROESOPHAGEAL REFLUX DISEASE WITHOUT ESOPHAGITIS: ICD-10-CM

## 2021-03-03 RX ORDER — FAMOTIDINE 40 MG/1
TABLET, FILM COATED ORAL
Qty: 90 TABLET | Refills: 0 | Status: SHIPPED | OUTPATIENT
Start: 2021-03-03 | End: 2022-04-01 | Stop reason: SDUPTHER

## 2021-04-12 RX ORDER — OMEPRAZOLE 20 MG/1
CAPSULE, DELAYED RELEASE ORAL
Qty: 90 CAPSULE | Refills: 1 | Status: SHIPPED | OUTPATIENT
Start: 2021-04-12 | End: 2021-10-20 | Stop reason: SDUPTHER

## 2021-07-13 DIAGNOSIS — F32.5 MAJOR DEPRESSION IN REMISSION (HCC): ICD-10-CM

## 2021-07-15 RX ORDER — TRAZODONE HYDROCHLORIDE 50 MG/1
TABLET ORAL
Qty: 90 TABLET | Refills: 0 | Status: SHIPPED | OUTPATIENT
Start: 2021-07-15 | End: 2022-05-12 | Stop reason: SDUPTHER

## 2021-07-15 RX ORDER — TOPIRAMATE 50 MG/1
TABLET, FILM COATED ORAL
Qty: 90 TABLET | Refills: 0 | Status: SHIPPED | OUTPATIENT
Start: 2021-07-15 | End: 2021-10-20 | Stop reason: SDUPTHER

## 2021-09-14 RX ORDER — GLIMEPIRIDE 2 MG/1
2 TABLET ORAL EVERY MORNING
Qty: 90 TABLET | Refills: 3 | Status: SHIPPED
Start: 2021-09-14 | End: 2022-01-19

## 2021-10-20 RX ORDER — OMEPRAZOLE 20 MG/1
CAPSULE, DELAYED RELEASE ORAL
Qty: 30 CAPSULE | Refills: 1 | Status: SHIPPED | OUTPATIENT
Start: 2021-10-20 | End: 2021-12-28 | Stop reason: SDUPTHER

## 2021-10-20 RX ORDER — TOPIRAMATE 50 MG/1
TABLET, FILM COATED ORAL
Qty: 30 TABLET | Refills: 0 | Status: SHIPPED | OUTPATIENT
Start: 2021-10-20 | End: 2022-01-26 | Stop reason: SDUPTHER

## 2021-10-20 RX ORDER — LEVOTHYROXINE SODIUM 0.2 MG/1
TABLET ORAL
Qty: 30 TABLET | Refills: 0 | Status: SHIPPED | OUTPATIENT
Start: 2021-10-20 | End: 2022-01-26 | Stop reason: SDUPTHER

## 2021-10-27 ENCOUNTER — OFFICE VISIT (OUTPATIENT)
Dept: MEDICAL GROUP | Facility: PHYSICIAN GROUP | Age: 56
End: 2021-10-27
Payer: COMMERCIAL

## 2021-10-27 VITALS
RESPIRATION RATE: 16 BRPM | HEIGHT: 66 IN | WEIGHT: 293 LBS | BODY MASS INDEX: 47.09 KG/M2 | OXYGEN SATURATION: 94 % | HEART RATE: 95 BPM | DIASTOLIC BLOOD PRESSURE: 88 MMHG | SYSTOLIC BLOOD PRESSURE: 122 MMHG | TEMPERATURE: 98.3 F

## 2021-10-27 DIAGNOSIS — Z79.4 TYPE 2 DIABETES MELLITUS WITH HYPERGLYCEMIA, WITH LONG-TERM CURRENT USE OF INSULIN (HCC): ICD-10-CM

## 2021-10-27 DIAGNOSIS — Z12.11 COLON CANCER SCREENING: ICD-10-CM

## 2021-10-27 DIAGNOSIS — E11.65 TYPE 2 DIABETES MELLITUS WITH HYPERGLYCEMIA, WITH LONG-TERM CURRENT USE OF INSULIN (HCC): ICD-10-CM

## 2021-10-27 DIAGNOSIS — Z79.891 CHRONIC USE OF OPIATE DRUG FOR THERAPEUTIC PURPOSE: ICD-10-CM

## 2021-10-27 DIAGNOSIS — F41.9 ANXIETY: Chronic | ICD-10-CM

## 2021-10-27 DIAGNOSIS — E03.9 ACQUIRED HYPOTHYROIDISM: Chronic | ICD-10-CM

## 2021-10-27 DIAGNOSIS — E11.9 TYPE 2 DIABETES MELLITUS WITHOUT COMPLICATION, WITHOUT LONG-TERM CURRENT USE OF INSULIN (HCC): Chronic | ICD-10-CM

## 2021-10-27 DIAGNOSIS — Z23 NEED FOR VACCINATION: ICD-10-CM

## 2021-10-27 DIAGNOSIS — Z01.419 WELL WOMAN EXAM: ICD-10-CM

## 2021-10-27 PROCEDURE — 90471 IMMUNIZATION ADMIN: CPT | Performed by: INTERNAL MEDICINE

## 2021-10-27 PROCEDURE — 99214 OFFICE O/P EST MOD 30 MIN: CPT | Mod: 25 | Performed by: INTERNAL MEDICINE

## 2021-10-27 PROCEDURE — 90686 IIV4 VACC NO PRSV 0.5 ML IM: CPT | Performed by: INTERNAL MEDICINE

## 2021-10-27 RX ORDER — LEVOTHYROXINE SODIUM 125 UG/1
200 CAPSULE ORAL DAILY
COMMUNITY
End: 2022-01-26

## 2021-10-27 ASSESSMENT — PATIENT HEALTH QUESTIONNAIRE - PHQ9: CLINICAL INTERPRETATION OF PHQ2 SCORE: 0

## 2021-10-27 ASSESSMENT — FIBROSIS 4 INDEX: FIB4 SCORE: 1.81

## 2021-10-27 NOTE — ASSESSMENT & PLAN NOTE
Chronic condition.  The patient also reported history of fibromyalgia.  The patient currently followed by pain specialist.  She is currently taking hydrocodone.  UDS ordered

## 2021-10-27 NOTE — PROGRESS NOTES
CC: Follow-up diabetes        HPI: This is a 56 y.o. pt.  Pt's medical history is notable for:     Type 2 diabetes mellitus without complication, without long-term current use of insulin ([facility])  This is a chronic condition.  The patient is currently taking glimepiride 2 mg daily.  Patient brought in lab test report done outside [facility]    On June 27, 2021 A1c was 11.3  On September 14, 2021 A1c was 10.8.    Patient has not had any follow-up since those blood test.  Patient reported that she has been under significant amount of stress recently and when she is stressed out the patient tends to reach for high carb and sweets        Chronic use of opiate drug for therapeutic purpose  Chronic condition.  The patient also reported history of fibromyalgia.  The patient currently followed by pain specialist.  She is currently taking hydrocodone.  UDS ordered    Anxiety  Chronic condition.  The patient is currently taking sertraline.    Acquired hypothyroidism  Chronic condition.  The patient is taking levothyroxine.  Lab tests ordered for follow-up.          REVIEW OF SYSTEMS:     Constitutional:  no fever / chills   Eyes: no changes in vision  ENT: no sore throat, no hearing loss  CV:  no chest pain, no palpitations  Pulmonary: no SOB, no cough          Allergies: Latex    Current Outpatient Medications Ordered in Epic   Medication Sig Dispense Refill   • LYRICA 150 MG Cap      • omeprazole (PRILOSEC) 20 MG delayed-release capsule Take 1 cap daily 30 Capsule 1   • topiramate (TOPAMAX) 50 MG tablet TAKE ONE TABLET BY MOUTH DAILY (GENERIC FOR TOPAMAX). Pt to make appt prior to more refills. 30 Tablet 0   • levothyroxine (SYNTHROID) 200 MCG Tab TAKE ONE TABLET BY MOUTH EVERY MORNING ON AN EMPTY STOMACH (Patient taking differently: TAKE ONE TABLET BY MOUTH EVERY MORNING ON AN EMPTY STOMACH    Pt taking 175) 30 Tablet 0   • glimepiride (AMARYL) 2 MG Tab Take 1 Tablet by mouth every morning. 90 Tablet 3   •  "traZODone (DESYREL) 50 MG Tab TAKE ONE TABLET BY MOUTH AT BEDTIME. Pt to make appt prior to more refills. 90 tablet 0   • famotidine (PEPCID) 40 MG Tab TAKE ONE TABLET BY MOUTH DAILY 90 tablet 0   • sertraline (ZOLOFT) 100 MG Tab TAKE TWO TABLETS BY MOUTH DAILY - ZOLOFT 180 Tab 0   • HYDROcodone/acetaminophen (NORCO)  MG Tab      • LYRICA 100 MG Cap      • ibuprofen (MOTRIN) 800 MG Tab TAKE ONE TABLET BY MOUTH EVERY 8 HOURS AS NEEDED 270 Tab 0   • cyclobenzaprine (FLEXERIL) 10 MG Tab TAKE ONE TABLET BY MOUTH THREE TIMES A DAY AS NEEDED FOR MUSCLE SPASMS 270 Tab 1   • BIOTIN by Does not apply route.     • Levothyroxine Sodium 125 MCG Cap Take 200 mcg by mouth every day. (Patient not taking: Reported on 10/27/2021)       No current [facility]-ordered facility-administered medications on file.       Past Medical, Social, and Family history reviewed and updated in EPIC     ------------------------------------------------------------------------------     PHYSICAL EXAM:   Vitals:    10/27/21 1118   BP: 122/88   Pulse: 95   Resp: 16   Temp: 36.8 °C (98.3 °F)   SpO2: 94%        Vitals:    10/27/21 1118   BP: 122/88   Weight: (!) 138 kg (305 lb)   Height: 1.676 m (5' 6\")         Body mass index is 49.23 kg/m².    Constitutional: no acute distress  CV: heart RRR  Resp: normal effort, no wheezing or rales.  GI: abdomen soft, no obvious mass, no tenderness  Neuro: CN 2-12 grossly intact  Monofilament testing with a 10 gram force: sensation intact: intact bilaterally  Visual Inspection: Feet without maceration, ulcers, fissures.  Pedal pulses: intact bilaterally      -----------------------------------------------------------------------------    ASSESSMENT:   1. Type 2 diabetes mellitus with hyperglycemia, with long-term current use of insulin (HCC)  INFLUENZA VACCINE QUAD INJ (PF)    HEMOGLOBIN A1C    ALANINE AMINO-TRANS    Basic Metabolic Panel    Lipid Profile    MICROALBUMIN CREAT RATIO URINE    TSH    REFERRAL TO " PHARMACOTHERAPY SERVICE    CANCELED: REFERRAL TO PHARMACOTHERAPY SERVICE   2. Need for vaccination     3. Chronic use of opiate drug for therapeutic purpose  Pain Management Screen   4. Anxiety     5. Acquired hypothyroidism     6. Well woman exam  REFERRAL TO GYNECOLOGY   7. Colon cancer screening  REFERRAL TO GI FOR COLONOSCOPY   8. Type 2 diabetes mellitus without complication, without long-term current use of insulin (HCC)             MEDICAL DECISION MAKING: DISCUSSION / STATUS / PLAN:    Diabetes mellitus.  Current control is unclear.  Lab tests ordered for follow-up.  Due to elevated A1c I have referred the patient to pharmacal therapy clinic for intervention  A1c goal of 7% discussed.  Pt's education:   -Advised the  benefits of blood glucose monitoring, potential hypoglycemia , medication mode of action/ possible side effects, the effects of exercise, potential acute and chronic conditions related to diabetes.   -Discussed with pt regarding changing diet and making better choices to help  blood sugar.  -Also encouraged pt to continue with regular exercises/walking.      Anxiety.  Chronic condition.  Continue with sertraline.  Offered to refer the patient to psychiatry.  The patient declined.    Hypothyroidism.  TSH ordered.    Health maintenance  Refer the patient to GI for screening colonoscopy  Refer the patient to gynecology for well woman exam     Recommend follow-up 3 to 4 months    -If any problems should arise, patient was advised to contact our office for followup or go to ER to be evaluated.    Please note that this dictation was created using voice recognition software. I have made every reasonable attempt to correct obvious errors, but it is possible there are errors of grammar and possibly content that I did not discover before finalizing the note.

## 2021-10-27 NOTE — ASSESSMENT & PLAN NOTE
This is a chronic condition.  The patient is currently taking glimepiride 2 mg daily.  Patient brought in lab test report done outside [facility] at [facility]    On June 27, 2021 A1c was 11.3  On September 14, 2021 A1c was 10.8.    Patient has not had any follow-up since those blood test.  Patient reported that she has been under significant amount of stress recently and when she is stressed out the patient tends to reach for high carb and sweets

## 2021-10-28 ENCOUNTER — DOCUMENTATION (OUTPATIENT)
Dept: VASCULAR LAB | Facility: MEDICAL CENTER | Age: 56
End: 2021-10-28

## 2021-10-28 NOTE — PROGRESS NOTES
[facility]    Received DM referral from Dr. Pereira on 10/27/21    Scheduled pt for initial appt to establish care on 11/8 @ 2:45 pm    Insurance: Marta MASSEY  PCP: [facility]  Locations to be seen: Any    [facility] at [phone], fax [phone]    Jay Arambula, OmegaD

## 2021-11-08 ENCOUNTER — DOCUMENTATION (OUTPATIENT)
Dept: VASCULAR LAB | Facility: MEDICAL CENTER | Age: 56
End: 2021-11-08

## 2021-11-08 NOTE — PROGRESS NOTES
[facility]    Pt called to cancel Initial Pharmacotherapy appt. She would like to defer care until start of 2022.     Pt was understanding of rationale of establishing care sooner though she would like to wait until after upcoming holidays.    Will follow up in ~2 months per pt request.      Leobardo Arrington, PharmD, [facility]armacotherapy [facility] at [phone], fax [phone]

## 2021-12-28 RX ORDER — OMEPRAZOLE 20 MG/1
CAPSULE, DELAYED RELEASE ORAL
Qty: 30 CAPSULE | Refills: 0 | Status: SHIPPED | OUTPATIENT
Start: 2021-12-28 | End: 2022-02-01

## 2021-12-29 DIAGNOSIS — F32.5 MAJOR DEPRESSION IN REMISSION (HCC): ICD-10-CM

## 2021-12-29 RX ORDER — SERTRALINE HYDROCHLORIDE 100 MG/1
TABLET, FILM COATED ORAL
Qty: 180 TABLET | Refills: 3 | Status: SHIPPED | OUTPATIENT
Start: 2021-12-29 | End: 2023-02-09 | Stop reason: SDUPTHER

## 2022-01-04 ENCOUNTER — DOCUMENTATION (OUTPATIENT)
Dept: VASCULAR LAB | Facility: MEDICAL CENTER | Age: 57
End: 2022-01-04

## 2022-01-04 NOTE — PROGRESS NOTES
[facility]      Called and left msg for pt to call back to establish care regarding pharmacotherapy referral for DM management  from Dr. Pereira on 10/27/21      Patient wanted to defer care until after new year. Called patient today and she was able to schedule.     Insurance: Marta / BRYSON  PCP: Esteban = Dr. Pereira  Locations to be seen: ANY     Phone number left for return call or any questions or concerns.  [facility] at [phone], fax [phone]      Anton DuffD

## 2022-01-19 ENCOUNTER — ANTICOAGULATION MONITORING (OUTPATIENT)
Dept: MEDICAL GROUP | Facility: PHYSICIAN GROUP | Age: 57
End: 2022-01-19

## 2022-01-19 ENCOUNTER — APPOINTMENT (OUTPATIENT)
Dept: CARDIOLOGY | Facility: PHYSICIAN GROUP | Age: 57
End: 2022-01-19
Payer: COMMERCIAL

## 2022-01-19 ENCOUNTER — NON-PROVIDER VISIT (OUTPATIENT)
Dept: MEDICAL GROUP | Facility: PHYSICIAN GROUP | Age: 57
End: 2022-01-19
Payer: COMMERCIAL

## 2022-01-19 VITALS
DIASTOLIC BLOOD PRESSURE: 93 MMHG | SYSTOLIC BLOOD PRESSURE: 164 MMHG | WEIGHT: 293 LBS | HEART RATE: 86 BPM | BODY MASS INDEX: 49.65 KG/M2

## 2022-01-19 DIAGNOSIS — E11.65 TYPE 2 DIABETES MELLITUS WITH HYPERGLYCEMIA, WITHOUT LONG-TERM CURRENT USE OF INSULIN (HCC): ICD-10-CM

## 2022-01-19 LAB
GLUCOSE BLD-MCNC: 241 MG/DL (ref 70–100)
HBA1C MFR BLD: 11.4 % (ref 0–5.6)
INT CON NEG: ABNORMAL
INT CON POS: ABNORMAL

## 2022-01-19 PROCEDURE — 82962 GLUCOSE BLOOD TEST: CPT | Performed by: STUDENT IN AN ORGANIZED HEALTH CARE EDUCATION/TRAINING PROGRAM

## 2022-01-19 PROCEDURE — 99404 PREV MED CNSL INDIV APPRX 60: CPT | Performed by: STUDENT IN AN ORGANIZED HEALTH CARE EDUCATION/TRAINING PROGRAM

## 2022-01-19 PROCEDURE — 83036 HEMOGLOBIN GLYCOSYLATED A1C: CPT | Performed by: STUDENT IN AN ORGANIZED HEALTH CARE EDUCATION/TRAINING PROGRAM

## 2022-01-19 RX ORDER — FUROSEMIDE 40 MG/1
40 TABLET ORAL DAILY
COMMUNITY
End: 2022-01-19

## 2022-01-19 RX ORDER — M-VIT,TX,IRON,MINS/CALC/FOLIC 27MG-0.4MG
1 TABLET ORAL DAILY
COMMUNITY
End: 2022-02-01

## 2022-01-19 RX ORDER — EMPAGLIFLOZIN, METFORMIN HYDROCHLORIDE 25; 1000 MG/1; MG/1
1 TABLET, EXTENDED RELEASE ORAL DAILY
Qty: 30 TABLET | Refills: 11 | Status: SHIPPED
Start: 2022-01-19 | End: 2022-01-25

## 2022-01-19 RX ORDER — VITAMIN B COMPLEX
2000 TABLET ORAL DAILY
COMMUNITY
End: 2022-02-01

## 2022-01-19 RX ORDER — FUROSEMIDE 40 MG/1
40 TABLET ORAL DAILY
Qty: 30 TABLET | Refills: 0 | Status: SHIPPED
Start: 2022-01-19 | End: 2022-04-27

## 2022-01-19 RX ORDER — CHLORAL HYDRATE 500 MG
1000 CAPSULE ORAL DAILY
COMMUNITY
End: 2022-02-01

## 2022-01-19 RX ORDER — LISINOPRIL 2.5 MG/1
2.5 TABLET ORAL DAILY
Qty: 30 TABLET | Refills: 1 | Status: SHIPPED | OUTPATIENT
Start: 2022-01-19 | End: 2022-04-01 | Stop reason: SDUPTHER

## 2022-01-19 ASSESSMENT — FIBROSIS 4 INDEX: FIB4 SCORE: 1.81

## 2022-01-19 NOTE — NON-PROVIDER
"Patient Consult Note    TIME IN: 12:54 pm  TIME OUT: 1:54pm    Primary care physician: Sha Pereira M.D.    Reason for consult: Management of Uncontrolled Type 2 Diabetes    HPI:  Gail Elias is a 56 y.o. old patient who comes in today for evaluation of above stated problem.    Doesn't take glimepiride consistently. Doesn't like to get up in the morning and eat. Only medication she is taking.     Thyroid is \"completely out of wack\" and is taking medication for it.     Irregularly tests her blood sugars.     Walking is difficult and standing too. Patient is in pain often and is on pain medications. Says her exercise tolerance is based on her pain level.     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 (neck pain, thoracic spine pain, lumbar spine pain)    Has not taken metformin before.    This is her first visit with pharmacotherapy services for diabetes.         Most Recent HbA1c:   Lab Results   Component Value Date/Time    HBA1C 11.4 (A) 01/19/2022 01:18 PM      Lab Results   Component Value Date/Time    CREATININE 0.79 02/24/2020 11:15 AM        Recent Labs     01/19/22  1320   POCGLUCOSE 241*       Diabetes Medication History and Current Regimen    Glimepiride 2mg     Pt has home glucometer and proper testing technique - yes    Pt reports blood sugars: 200-300 (typical)  Tested 241 FBG      Hypoglycemia awareness - yes  Nocturnal hypoglycemia- none  Hypoglycemia:  None    Pt's treatment of Hypoglycemia -  - 15:15 Rule    Current Exercise - not at all. Plans to begin using an exercise machine soon once she gets it moved properly.  Exercise Goal - Pool workouts, walking more frequently as tolerated. Goal is 60 minutes a day and weekly resistance training as tolerated.    Dietary - orders from frozen food delivery. Not enough vegetables. Friend meats/meats, stir fries (with veggies and nuts), meat, potatoes, and veggies      Snack - not typical      Foot Exam:  Monofilament exam - current  Monofilament testing with a " 10 gram force: sensation intact: decreased bilaterally.    Visual Inspection: Feet without maceration, ulcers, fissures.  Feet dry.  Pedal pulses: intact bilaterally    Preventative Management  BP regimen (ACE/ARB) - lisinopril 2.5 mg  Fish oil 1000 mg    Last Retinal Scan - overdue pt has done outside of [facility]  Last Foot Exam - completed today in clinic  Last A1c -   Lab Results   Component Value Date/Time    HBA1C 11.4 (A) 01/19/2022 01:18 PM      Last Microalbuminuria - will address at next visit     updated caregaps    Past Medical History:  Patient Active Problem List    Diagnosis Date Noted   • Type 2 diabetes mellitus with hyperglycemia ([facility]) 10/27/2021   • Insomnia 07/06/2020   • Morbid obesity with BMI of 45.0-49.9, adult ([facility]) 02/24/2020   • Type 2 diabetes mellitus without complication, without long-term current use of insulin ([facility]) 06/04/2019   • Bilateral carpal tunnel syndrome 10/23/2018   • Anxiety 07/19/2018   • DDD (degenerative disc disease), lumbar 09/26/2017   • Chronic use of opiate drug for therapeutic purpose 02/17/2017   • Acquired hypothyroidism 12/18/2015   • Spondylosis of cervical region without myelopathy or radiculopathy 08/28/2015   • Other migraine without status migrainosus, not intractable 08/28/2015   • Fibromyalgia 04/04/2013   • ENDOMETRIOSIS 09/13/2011   • PCOS (polycystic ovarian syndrome) 02/01/2011   • GERD (gastroesophageal reflux disease) 02/01/2011       Past Surgical History:  Past Surgical History:   Procedure Laterality Date   • ABDOMINAL EXPLORATION     • CERVICAL DISK AND FUSION ANTERIOR     • LAPAROSCOPY     • LUMBAR LAMINECTOMY DISKECTOMY     • TONSILLECTOMY AND ADENOIDECTOMY         Allergies:  Latex    Social History:  Social History     Socioeconomic History   • Marital status:      Spouse name: Not on file   • Number of children: Not on file   • Years of education: Not on file   • Highest education level: Associate degree: occupational, technical, or  vocational program   Occupational History   • Not on file   Tobacco Use   • Smoking status: Never Smoker   • Smokeless tobacco: Never Used   • Tobacco comment: avoid all tobacco products   Vaping Use   • Vaping Use: Never used   Substance and Sexual Activity   • Alcohol use: No     Alcohol/week: 0.0 oz     Comment: occasionally   • Drug use: No   • Sexual activity: Yes     Partners: Male     Birth control/protection: Post-Menopausal     Comment:    Other Topics Concern   •  Service Not Asked   • Blood Transfusions Not Asked   • Caffeine Concern Not Asked   • Occupational Exposure Not Asked   • Hobby Hazards Not Asked   • Sleep Concern Yes   • Stress Concern Not Asked   • Weight Concern Yes   • Special Diet No   • Back Care Not Asked   • Exercise No   • Bike Helmet Not Asked   • Seat Belt Not Asked   • Self-Exams Not Asked   Social History Narrative   • Not on file     Social Determinants of Health     Financial Resource Strain:    • Difficulty of Paying Living Expenses: Not on file   Food Insecurity:    • Worried About Running Out of Food in the Last Year: Not on file   • Ran Out of Food in the Last Year: Not on file   Transportation Needs:    • Lack of Transportation (Medical): Not on file   • Lack of Transportation (Non-Medical): Not on file   Physical Activity:    • Days of Exercise per Week: Not on file   • Minutes of Exercise per Session: Not on file   Stress:    • Feeling of Stress : Not on file   Social Connections:    • Frequency of Communication with Friends and Family: Not on file   • Frequency of Social Gatherings with Friends and Family: Not on file   • Attends Evangelical Services: Not on file   • Active Member of Clubs or Organizations: Not on file   • Attends Club or Organization Meetings: Not on file   • Marital Status: Not on file   Intimate Partner Violence:    • Fear of Current or Ex-Partner: Not on file   • Emotionally Abused: Not on file   • Physically Abused: Not on file   •  Sexually Abused: Not on file   Housing Stability:    • Unable to Pay for Housing in the Last Year: Not on file   • Number of Places Lived in the Last Year: Not on file   • Unstable Housing in the Last Year: Not on file       Family History:  Family History   Problem Relation Age of Onset   • Hyperlipidemia Mother    • Cancer Mother         ? vocal cord   • Other Mother         multi organ failure/cardiac arrest   • Diabetes Sister    • Hypertension Sister    • Hyperlipidemia Sister    • Diabetes Brother    • Hypertension Brother    • Hyperlipidemia Brother    • Heart Disease Maternal Aunt    • Heart Disease Maternal Grandmother    • Diabetes Paternal Grandmother    • Stroke Neg Hx        Medications:    Current Outpatient Medications:   •  Omega-3 Fatty Acids (FISH OIL) 1000 MG Cap capsule, Take 1,000 mg by mouth every day., Disp: , Rfl:   •  therapeutic multivitamin-minerals (THERAGRAN-M) Tab, Take 1 Tablet by mouth every day., Disp: , Rfl:   •  vitamin D3 (CHOLECALCIFEROL) 1000 Unit (25 mcg) Tab, Take 2,000 Units by mouth every day., Disp: , Rfl:   •  Empagliflozin-metFORMIN HCl ER (SYNJARDY XR)  MG TABLET SR 24 HR, Take 1 Tablet by mouth every day., Disp: 30 Tablet, Rfl: 11  •  furosemide (LASIX) 40 MG Tab, Take 1 Tablet by mouth every day., Disp: 30 Tablet, Rfl: 0  •  lisinopril (PRINIVIL) 2.5 MG Tab, Take 1 Tablet by mouth every day., Disp: 30 Tablet, Rfl: 1  •  sertraline (ZOLOFT) 100 MG Tab, TAKE TWO TABLETS BY MOUTH DAILY - ZOLOFT, Disp: 180 Tablet, Rfl: 3  •  omeprazole (PRILOSEC) 20 MG delayed-release capsule, Take 1 cap daily, Disp: 30 Capsule, Rfl: 0  •  LYRICA 150 MG Cap, , Disp: , Rfl:   •  Levothyroxine Sodium 125 MCG Cap, Take 200 mcg by mouth every day. (Patient not taking: Reported on 10/27/2021), Disp: , Rfl:   •  topiramate (TOPAMAX) 50 MG tablet, TAKE ONE TABLET BY MOUTH DAILY (GENERIC FOR TOPAMAX). Pt to make appt prior to more refills., Disp: 30 Tablet, Rfl: 0  •  levothyroxine  (SYNTHROID) 200 MCG Tab, TAKE ONE TABLET BY MOUTH EVERY MORNING ON AN EMPTY STOMACH (Patient taking differently: TAKE ONE TABLET BY MOUTH EVERY MORNING ON AN EMPTY STOMACH  Pt taking 175), Disp: 30 Tablet, Rfl: 0  •  traZODone (DESYREL) 50 MG Tab, TAKE ONE TABLET BY MOUTH AT BEDTIME. Pt to make appt prior to more refills., Disp: 90 tablet, Rfl: 0  •  famotidine (PEPCID) 40 MG Tab, TAKE ONE TABLET BY MOUTH DAILY, Disp: 90 tablet, Rfl: 0  •  HYDROcodone/acetaminophen (NORCO)  MG Tab, , Disp: , Rfl:   •  LYRICA 100 MG Cap, , Disp: , Rfl:   •  ibuprofen (MOTRIN) 800 MG Tab, TAKE ONE TABLET BY MOUTH EVERY 8 HOURS AS NEEDED, Disp: 270 Tab, Rfl: 0  •  cyclobenzaprine (FLEXERIL) 10 MG Tab, TAKE ONE TABLET BY MOUTH THREE TIMES A DAY AS NEEDED FOR MUSCLE SPASMS, Disp: 270 Tab, Rfl: 1  •  BIOTIN, by Does not apply route., Disp: , Rfl:     Labs: Reviewed    Physical Examination:  Vital signs: BP (!) 164/93   Pulse 86   Wt (!) 140 kg (307 lb 9.6 oz)   BMI 49.65 kg/m²  Body mass index is 49.65 kg/m².    Assessment and Plan:    1. DM2  • A1c 11.4 in the office  • /93 - will start low dose lisinopril and titrate as needed  • FSBG 241 in the office  • STARTING weight 308 lbs   •     - Medication changes:  • Start synjardy   • Start rybelsus 3mg once daily  • Stop glimipiride    - Lifestyle changes:  • Pt eats a diet rich in simple carbohydrate, juice, sugar sweetened soda, pasta, bread dried fruit.  • Pt agrees to STOP JUICE, pt agrees to rEDUCE sugar sweetened soda to 3-4 times per week.        Return in about 4 weeks (around 2/16/2022).    Thank you for allowing me to participate in the care of this patient.    Charli Sesay (Student Intern)    I have reviewed and concur with the above plan on 1/19/2022.    Sapna Melendrez  01/19/22    CC:   Sha Pereira M.D.

## 2022-01-24 ENCOUNTER — TELEPHONE (OUTPATIENT)
Dept: MEDICAL GROUP | Facility: PHYSICIAN GROUP | Age: 57
End: 2022-01-24

## 2022-01-24 NOTE — TELEPHONE ENCOUNTER
DOCUMENTATION OF PAR STATUS:    1. Name of Medication & Dose: Synjardy XR 25-1000mg er tabs     2. Name of Prescription Coverage Company & phone #: [employer]    3. Date Prior Auth Submitted: 01/24/2022    4. What information was given to obtain insurance decision? Cover My Meds    5. Prior Auth Status? Pending    6. Patient Notified: yes

## 2022-01-25 DIAGNOSIS — E11.9 TYPE 2 DIABETES MELLITUS WITHOUT COMPLICATION, WITHOUT LONG-TERM CURRENT USE OF INSULIN (HCC): ICD-10-CM

## 2022-01-26 DIAGNOSIS — E03.9 ACQUIRED HYPOTHYROIDISM: ICD-10-CM

## 2022-01-26 DIAGNOSIS — E11.9 TYPE 2 DIABETES MELLITUS WITHOUT COMPLICATION, WITHOUT LONG-TERM CURRENT USE OF INSULIN (HCC): ICD-10-CM

## 2022-01-26 RX ORDER — TOPIRAMATE 50 MG/1
TABLET, FILM COATED ORAL
Qty: 30 TABLET | Refills: 5 | Status: SHIPPED | OUTPATIENT
Start: 2022-01-26 | End: 2022-05-12 | Stop reason: SDUPTHER

## 2022-01-26 RX ORDER — LEVOTHYROXINE SODIUM 0.2 MG/1
TABLET ORAL
Qty: 30 TABLET | Refills: 0 | Status: SHIPPED | OUTPATIENT
Start: 2022-01-26 | End: 2022-03-02 | Stop reason: SDUPTHER

## 2022-01-26 NOTE — TELEPHONE ENCOUNTER
PHONECALL  1. Caller Name: Gail brown                      Call Back Number: [phone]    2. Message: pt was giving us a call back from message from marissa. She stated she is out of both her thyroid med and topiramate. She states she hasn't done her labs but she is unsure whether without it if It will give a higher level. She also stated she has had several migraines since running out

## 2022-01-26 NOTE — TELEPHONE ENCOUNTER
Phone Number Called: [phone] (home)       Call outcome: Did not leave a detailed message. Requested patient to call back.    Message: 1st attempt

## 2022-01-26 NOTE — TELEPHONE ENCOUNTER
FINAL PRIOR AUTHORIZATION STATUS:    1.  Name of Medication & Dose: Synjardy     2. Prior Auth Status: Denied.  Reason: pt must first have an inadequate treatment response to, or an intolerance to, or a contraindication to metformin    3. Action Taken: Pharmacy Notified: no Patient Notified: no

## 2022-01-26 NOTE — TELEPHONE ENCOUNTER
Phone Number Called: [phone]    Call outcome: Spoke to patient regarding message below.    Message: spoke to pt regarding message below. Pt verbalized understanding.

## 2022-02-01 ENCOUNTER — TELEPHONE (OUTPATIENT)
Dept: MEDICAL GROUP | Facility: PHYSICIAN GROUP | Age: 57
End: 2022-02-01

## 2022-02-01 RX ORDER — OMEPRAZOLE 20 MG/1
CAPSULE, DELAYED RELEASE ORAL
Qty: 30 CAPSULE | Refills: 0 | Status: SHIPPED | OUTPATIENT
Start: 2022-02-01 | End: 2022-03-02 | Stop reason: SDUPTHER

## 2022-02-02 NOTE — TELEPHONE ENCOUNTER
Called pt to clarify pt's medication refill. Went over medication and pt stated she is not taking lyrica 100mg, only the lyrica 150 mg.

## 2022-03-02 ENCOUNTER — NON-PROVIDER VISIT (OUTPATIENT)
Dept: MEDICAL GROUP | Facility: PHYSICIAN GROUP | Age: 57
End: 2022-03-02
Payer: OTHER GOVERNMENT

## 2022-03-02 VITALS — BODY MASS INDEX: 47.61 KG/M2 | WEIGHT: 293 LBS

## 2022-03-02 DIAGNOSIS — E11.9 TYPE 2 DIABETES MELLITUS WITHOUT COMPLICATION, WITHOUT LONG-TERM CURRENT USE OF INSULIN (HCC): ICD-10-CM

## 2022-03-02 LAB
GLUCOSE BLD-MCNC: 126 MG/DL (ref 70–100)
HBA1C MFR BLD: 8.9 % (ref 0–5.6)
INT CON NEG: ABNORMAL
INT CON POS: ABNORMAL

## 2022-03-02 PROCEDURE — 83036 HEMOGLOBIN GLYCOSYLATED A1C: CPT | Performed by: STUDENT IN AN ORGANIZED HEALTH CARE EDUCATION/TRAINING PROGRAM

## 2022-03-02 PROCEDURE — 99402 PREV MED CNSL INDIV APPRX 30: CPT | Performed by: FAMILY MEDICINE

## 2022-03-02 PROCEDURE — 82962 GLUCOSE BLOOD TEST: CPT | Performed by: STUDENT IN AN ORGANIZED HEALTH CARE EDUCATION/TRAINING PROGRAM

## 2022-03-02 PROCEDURE — 83036 HEMOGLOBIN GLYCOSYLATED A1C: CPT | Performed by: FAMILY MEDICINE

## 2022-03-02 PROCEDURE — 82962 GLUCOSE BLOOD TEST: CPT | Performed by: FAMILY MEDICINE

## 2022-03-02 RX ORDER — EMPAGLIFLOZIN, METFORMIN HYDROCHLORIDE 25; 1000 MG/1; MG/1
1 TABLET, EXTENDED RELEASE ORAL DAILY
Qty: 90 TABLET | Refills: 1 | Status: SHIPPED | OUTPATIENT
Start: 2022-03-02 | End: 2022-04-27 | Stop reason: SDUPTHER

## 2022-03-02 RX ORDER — LEVOTHYROXINE SODIUM 0.2 MG/1
TABLET ORAL
Qty: 30 TABLET | Refills: 0 | Status: SHIPPED | OUTPATIENT
Start: 2022-03-02 | End: 2022-04-13 | Stop reason: SDUPTHER

## 2022-03-02 RX ORDER — OMEPRAZOLE 20 MG/1
CAPSULE, DELAYED RELEASE ORAL
Qty: 30 CAPSULE | Refills: 0 | Status: SHIPPED | OUTPATIENT
Start: 2022-03-02 | End: 2022-04-01 | Stop reason: SDUPTHER

## 2022-03-02 NOTE — NON-PROVIDER
Patient Consult Note    TIME IN: 11:21  TIME OUT: 11:59    Primary care physician: Sha Pereira M.D.    Reason for consult: Management of Uncontrolled Type 2 Diabetes    HPI:  Gail Elias is a 56 y.o. old patient who comes in today for evaluation of above stated problem.    Most Recent HbA1c:   Lab Results   Component Value Date/Time    HBA1C 8.9 (A) 03/02/2022 11:41 AM      Lab Results   Component Value Date/Time    CREATININE 0.79 02/24/2020 11:15 AM        Recent Labs     03/02/22  1141   POCGLUCOSE 126*       Diabetes Medication History and Current Regimen  synjardy 25-1000mg po daukt      Pt has home glucometer and proper testing technique -      Pt reports blood sugars:     Other times:    Interrogated BS meter 133 7 day average  134 14 day average  138 30 day average    Hypoglycemia awareness - yes  Nocturnal hypoglycemia- none  Hypoglycemia:  None    Pt's treatment of Hypoglycemia - n/a  - 15:15 Rule    Current Exercise - none  Exercise Goal - pt would benefit from 60 minutes of daily dedicated walking     Dietary - reduced carbohydrate          Foot Exam:  Monofilament exam - current  Monofilament testing with a 10 gram force: sensation intact: decreased bilaterally.    Visual Inspection: Feet without maceration, ulcers, fissures.  Feet dry.  Pedal pulses: intact bilaterally    Preventative Management  BP regimen (ACE/ARB) - lisiopril 2.5mg daily  ASA - will address at next visit  Statin - pt has not completed labs  Last Retinal Scan - current  Last Foot Exam - current  Last A1c -   Lab Results   Component Value Date/Time    HBA1C 8.9 (A) 03/02/2022 11:41 AM      Last Microalbuminuria - ordered     updated caregaps    Past Medical History:  Patient Active Problem List    Diagnosis Date Noted   • Type 2 diabetes mellitus with hyperglycemia ([facility]) 10/27/2021   • Insomnia 07/06/2020   • Morbid obesity with BMI of 45.0-49.9, adult ([facility]) 02/24/2020   • Type 2 diabetes mellitus without complication,  without long-term current use of insulin ([facility]) 06/04/2019   • Bilateral carpal tunnel syndrome 10/23/2018   • Anxiety 07/19/2018   • DDD (degenerative disc disease), lumbar 09/26/2017   • Chronic use of opiate drug for therapeutic purpose 02/17/2017   • Acquired hypothyroidism 12/18/2015   • Spondylosis of cervical region without myelopathy or radiculopathy 08/28/2015   • Other migraine without status migrainosus, not intractable 08/28/2015   • Fibromyalgia 04/04/2013   • ENDOMETRIOSIS 09/13/2011   • PCOS (polycystic ovarian syndrome) 02/01/2011   • GERD (gastroesophageal reflux disease) 02/01/2011       Past Surgical History:  Past Surgical History:   Procedure Laterality Date   • ABDOMINAL EXPLORATION     • CERVICAL DISK AND FUSION ANTERIOR     • LAPAROSCOPY     • LUMBAR LAMINECTOMY DISKECTOMY     • TONSILLECTOMY AND ADENOIDECTOMY         Allergies:  Latex    Social History:  Social History     Socioeconomic History   • Marital status:      Spouse name: Not on file   • Number of children: Not on file   • Years of education: Not on file   • Highest education level: Associate degree: occupational, technical, or vocational program   Occupational History   • Not on file   Tobacco Use   • Smoking status: Never Smoker   • Smokeless tobacco: Never Used   • Tobacco comment: avoid all tobacco products   Vaping Use   • Vaping Use: Never used   Substance and Sexual Activity   • Alcohol use: No     Alcohol/week: 0.0 oz     Comment: occasionally   • Drug use: No   • Sexual activity: Yes     Partners: Male     Birth control/protection: Post-Menopausal     Comment:    Other Topics Concern   •  Service Not Asked   • Blood Transfusions Not Asked   • Caffeine Concern Not Asked   • Occupational Exposure Not Asked   • Hobby Hazards Not Asked   • Sleep Concern Yes   • Stress Concern Not Asked   • Weight Concern Yes   • Special Diet No   • Back Care Not Asked   • Exercise No   • Bike Helmet Not Asked   • Seat Belt  Not Asked   • Self-Exams Not Asked   Social History Narrative   • Not on file     Social Determinants of Health     Financial Resource Strain: Not on file   Food Insecurity: Not on file   Transportation Needs: Not on file   Physical Activity: Not on file   Stress: Not on file   Social Connections: Not on file   Intimate Partner Violence: Not on file   Housing Stability: Not on file       Family History:  Family History   Problem Relation Age of Onset   • Hyperlipidemia Mother    • Cancer Mother         ? vocal cord   • Other Mother         multi organ failure/cardiac arrest   • Diabetes Sister    • Hypertension Sister    • Hyperlipidemia Sister    • Diabetes Brother    • Hypertension Brother    • Hyperlipidemia Brother    • Heart Disease Maternal Aunt    • Heart Disease Maternal Grandmother    • Diabetes Paternal Grandmother    • Stroke Neg Hx        Medications:    Current Outpatient Medications:   •  Empagliflozin-metFORMIN HCl ER (SYNJARDY XR)  MG TABLET SR 24 HR, Take 1 Tablet by mouth every day., Disp: 90 Tablet, Rfl: 1  •  omeprazole (PRILOSEC) 20 MG delayed-release capsule, TAKE ONE CAPSULE BY MOUTH DAILY, Disp: 30 Capsule, Rfl: 0  •  levothyroxine (SYNTHROID) 200 MCG Tab, TAKE ONE TABLET BY MOUTH EVERY MORNING ON AN EMPTY STOMACH, Disp: 30 Tablet, Rfl: 0  •  topiramate (TOPAMAX) 50 MG tablet, TAKE ONE TABLET BY MOUTH DAILY (GENERIC FOR TOPAMAX)., Disp: 30 Tablet, Rfl: 5  •  furosemide (LASIX) 40 MG Tab, Take 1 Tablet by mouth every day., Disp: 30 Tablet, Rfl: 0  •  lisinopril (PRINIVIL) 2.5 MG Tab, Take 1 Tablet by mouth every day., Disp: 30 Tablet, Rfl: 1  •  sertraline (ZOLOFT) 100 MG Tab, TAKE TWO TABLETS BY MOUTH DAILY - ZOLOFT, Disp: 180 Tablet, Rfl: 3  •  LYRICA 150 MG Cap, , Disp: , Rfl:   •  traZODone (DESYREL) 50 MG Tab, TAKE ONE TABLET BY MOUTH AT BEDTIME. Pt to make appt prior to more refills., Disp: 90 tablet, Rfl: 0  •  famotidine (PEPCID) 40 MG Tab, TAKE ONE TABLET BY MOUTH DAILY, Disp:  90 tablet, Rfl: 0  •  HYDROcodone/acetaminophen (NORCO)  MG Tab, , Disp: , Rfl:   •  ibuprofen (MOTRIN) 800 MG Tab, TAKE ONE TABLET BY MOUTH EVERY 8 HOURS AS NEEDED, Disp: 270 Tab, Rfl: 0  •  cyclobenzaprine (FLEXERIL) 10 MG Tab, TAKE ONE TABLET BY MOUTH THREE TIMES A DAY AS NEEDED FOR MUSCLE SPASMS, Disp: 270 Tab, Rfl: 1    Labs: Reviewed    Physical Examination:  Vital signs: Wt (!) 134 kg (295 lb)   BMI 47.61 kg/m²  Body mass index is 47.61 kg/m².    Assessment and Plan:    1. DM2  • Pt has lost 13 lbs since last visit  • Pt FSBG in better control 126 in the office today  • Pt has been on synjardy 25-1000mg po daily with no se  • A1c has improved from 11.4 to 8.9 in 5 weeks, I congratulated her on her progress.  •     - Medication changes:  • none     - Lifestyle changes:  • Pt would benefit from daily dedicated walking 60 minutes daily  • Continue to reduce simple carbohydrate consumption  •     Return in about 6 weeks (around 4/13/2022).    Sapna Melendrez  03/02/22    CC:   Sha Pereira M.D.

## 2022-03-07 NOTE — TELEPHONE ENCOUNTER
Phone Number Called: [phone] (home)       Call outcome: Did not leave a detailed message. Requested patient to call back.    Message: 1st attempt

## 2022-04-01 DIAGNOSIS — K21.9 GASTROESOPHAGEAL REFLUX DISEASE WITHOUT ESOPHAGITIS: ICD-10-CM

## 2022-04-02 RX ORDER — FAMOTIDINE 40 MG/1
40 TABLET, FILM COATED ORAL DAILY
Qty: 90 TABLET | Refills: 0 | Status: SHIPPED | OUTPATIENT
Start: 2022-04-02 | End: 2022-07-28 | Stop reason: SDUPTHER

## 2022-04-02 RX ORDER — OMEPRAZOLE 20 MG/1
CAPSULE, DELAYED RELEASE ORAL
Qty: 30 CAPSULE | Refills: 5 | Status: SHIPPED
Start: 2022-04-02 | End: 2022-12-14

## 2022-04-02 RX ORDER — LISINOPRIL 2.5 MG/1
2.5 TABLET ORAL DAILY
Qty: 30 TABLET | Refills: 5 | Status: SHIPPED | OUTPATIENT
Start: 2022-04-02 | End: 2022-04-27 | Stop reason: SDUPTHER

## 2022-04-13 RX ORDER — LEVOTHYROXINE SODIUM 0.2 MG/1
TABLET ORAL
Qty: 30 TABLET | Refills: 0 | Status: SHIPPED
Start: 2022-04-13 | End: 2022-05-12

## 2022-04-13 NOTE — TELEPHONE ENCOUNTER
Called pt and spoke with her about labs. Pt stated she lost her insurance and hasn't been able to do the labs.     She will try her best.

## 2022-04-21 ENCOUNTER — TELEPHONE (OUTPATIENT)
Dept: VASCULAR LAB | Facility: MEDICAL CENTER | Age: 57
End: 2022-04-21
Payer: OTHER GOVERNMENT

## 2022-04-21 NOTE — TELEPHONE ENCOUNTER
Called pt regarding missed pharmacotherapy appt - no answer. LVM asking pt to c/b to reschedule.     Will f/u at a later time.    Jay Arambula, OmegaD, BCACP

## 2022-04-21 NOTE — TELEPHONE ENCOUNTER
Left vm message to reschedule missed DM fu with the pharmacist  Sapna Melendrez, Clinical Pharmacist, CDE, CACP

## 2022-04-27 ENCOUNTER — NON-PROVIDER VISIT (OUTPATIENT)
Dept: MEDICAL GROUP | Facility: PHYSICIAN GROUP | Age: 57
End: 2022-04-27
Payer: OTHER GOVERNMENT

## 2022-04-27 ENCOUNTER — TELEPHONE (OUTPATIENT)
Dept: MEDICAL GROUP | Facility: PHYSICIAN GROUP | Age: 57
End: 2022-04-27

## 2022-04-27 ENCOUNTER — ANTICOAGULATION MONITORING (OUTPATIENT)
Dept: MEDICAL GROUP | Facility: PHYSICIAN GROUP | Age: 57
End: 2022-04-27

## 2022-04-27 VITALS — WEIGHT: 289.2 LBS | BODY MASS INDEX: 46.68 KG/M2

## 2022-04-27 DIAGNOSIS — E11.65 TYPE 2 DIABETES MELLITUS WITH HYPERGLYCEMIA, WITHOUT LONG-TERM CURRENT USE OF INSULIN (HCC): ICD-10-CM

## 2022-04-27 LAB
GLUCOSE BLD-MCNC: 269 MG/DL (ref 70–100)
HBA1C MFR BLD: 7.6 % (ref 0–5.6)
INT CON NEG: ABNORMAL
INT CON POS: ABNORMAL

## 2022-04-27 PROCEDURE — 99402 PREV MED CNSL INDIV APPRX 30: CPT | Performed by: FAMILY MEDICINE

## 2022-04-27 PROCEDURE — 82962 GLUCOSE BLOOD TEST: CPT | Performed by: STUDENT IN AN ORGANIZED HEALTH CARE EDUCATION/TRAINING PROGRAM

## 2022-04-27 PROCEDURE — 83036 HEMOGLOBIN GLYCOSYLATED A1C: CPT | Performed by: STUDENT IN AN ORGANIZED HEALTH CARE EDUCATION/TRAINING PROGRAM

## 2022-04-27 RX ORDER — EMPAGLIFLOZIN, METFORMIN HYDROCHLORIDE 25; 1000 MG/1; MG/1
1 TABLET, EXTENDED RELEASE ORAL DAILY
Qty: 90 TABLET | Refills: 1 | Status: SHIPPED
Start: 2022-04-27 | End: 2022-05-12

## 2022-04-27 RX ORDER — LISINOPRIL 2.5 MG/1
2.5 TABLET ORAL DAILY
Qty: 30 TABLET | Refills: 5 | Status: SHIPPED
Start: 2022-04-27 | End: 2022-05-12

## 2022-04-27 NOTE — PROGRESS NOTES
Patient Consult Note    TIME IN: 0900  TIME OUT: 0930      Refill lisinopril message to: synthroid, famotidine, lyrica, omeprazole   Primary care physician: Sha Pereira M.D.    Reason for consult: Management of Uncontrolled Type 2 Diabetes    HPI:  Gail Elias is a 56 y.o. old patient who comes in today for evaluation of above stated problem.    Most Recent HbA1c and POCT glucose:   Lab Results   Component Value Date/Time    HBA1C 7.6 (A) 04/27/2022 09:17 AM      Recent Labs     04/27/22  0918   POCGLUCOSE 269*       Most Recent Scr:  Lab Results   Component Value Date/Time    CREATININE 0.79 02/24/2020 11:15 AM        Diabetes Medication History and Current Regimen  PT had insurance changes and has been out of synjardy and has only been taking metformin. She has had intense GI upset (nausea/vomitting/diarrhea)  Pt has been without medications for ~1 week    synjardy 25 mg/1,000 mg      Pt has home glucometer and proper testing technique - yes    Pt reports blood sugars:   Before Breakfast: 150-160      Hypoglycemia awareness - Yes  Nocturnal hypoglycemia- No  Hypoglycemia:  None    Pt's treatment of Hypoglycemia -   - 15:15 Rule    Current Exercise - Pt has back pain, and hasn't been able to get in the amount she would like  Exercise Goal - 30-60 minutes per day    Dietary - PT has been trying to lose weight and eat better however she has not felt good. Carrots, celery, apple sauce     Foot Exam:  Monofilament exam - current  Monofilament testing with a 10 gram force: sensation intact: decreased bilaterally.    Visual Inspection: Feet without maceration, ulcers, fissures.  Feet dry.  Pedal pulses: intact bilaterally    Preventative Management  BP regimen (ACE/ARB) - Lisinopril 2.5 mg daily  ASA - None  Statin - None  Last Retinal Scan - 1/2022  Last Foot Exam - 10/2022  Last A1c -   Lab Results   Component Value Date/Time    HBA1C 7.6 (A) 04/27/2022 09:17 AM      Last Microalbuminuria - Due    updated  caregaps    Past Medical History:  Patient Active Problem List    Diagnosis Date Noted   • Type 2 diabetes mellitus with hyperglycemia ([facility]) 10/27/2021   • Insomnia 07/06/2020   • Morbid obesity with BMI of 45.0-49.9, adult ([facility]) 02/24/2020   • Type 2 diabetes mellitus without complication, without long-term current use of insulin ([facility]) 06/04/2019   • Bilateral carpal tunnel syndrome 10/23/2018   • Anxiety 07/19/2018   • DDD (degenerative disc disease), lumbar 09/26/2017   • Chronic use of opiate drug for therapeutic purpose 02/17/2017   • Acquired hypothyroidism 12/18/2015   • Spondylosis of cervical region without myelopathy or radiculopathy 08/28/2015   • Other migraine without status migrainosus, not intractable 08/28/2015   • Fibromyalgia 04/04/2013   • ENDOMETRIOSIS 09/13/2011   • PCOS (polycystic ovarian syndrome) 02/01/2011   • GERD (gastroesophageal reflux disease) 02/01/2011       Past Surgical History:  Past Surgical History:   Procedure Laterality Date   • ABDOMINAL EXPLORATION     • CERVICAL DISK AND FUSION ANTERIOR     • LAPAROSCOPY     • LUMBAR LAMINECTOMY DISKECTOMY     • TONSILLECTOMY AND ADENOIDECTOMY         Allergies:  Latex    Social History:  Social History     Socioeconomic History   • Marital status:      Spouse name: Not on file   • Number of children: Not on file   • Years of education: Not on file   • Highest education level: Associate degree: occupational, technical, or vocational program   Occupational History   • Not on file   Tobacco Use   • Smoking status: Never Smoker   • Smokeless tobacco: Never Used   • Tobacco comment: avoid all tobacco products   Vaping Use   • Vaping Use: Never used   Substance and Sexual Activity   • Alcohol use: No     Alcohol/week: 0.0 oz     Comment: occasionally   • Drug use: No   • Sexual activity: Yes     Partners: Male     Birth control/protection: Post-Menopausal     Comment:    Other Topics Concern   •  Service Not Asked   • Blood  Transfusions Not Asked   • Caffeine Concern Not Asked   • Occupational Exposure Not Asked   • Hobby Hazards Not Asked   • Sleep Concern Yes   • Stress Concern Not Asked   • Weight Concern Yes   • Special Diet No   • Back Care Not Asked   • Exercise No   • Bike Helmet Not Asked   • Seat Belt Not Asked   • Self-Exams Not Asked   Social History Narrative   • Not on file     Social Determinants of Health     Financial Resource Strain: Not on file   Food Insecurity: Not on file   Transportation Needs: Not on file   Physical Activity: Not on file   Stress: Not on file   Social Connections: Not on file   Intimate Partner Violence: Not on file   Housing Stability: Not on file       Family History:  Family History   Problem Relation Age of Onset   • Hyperlipidemia Mother    • Cancer Mother         ? vocal cord   • Other Mother         multi organ failure/cardiac arrest   • Diabetes Sister    • Hypertension Sister    • Hyperlipidemia Sister    • Diabetes Brother    • Hypertension Brother    • Hyperlipidemia Brother    • Heart Disease Maternal Aunt    • Heart Disease Maternal Grandmother    • Diabetes Paternal Grandmother    • Stroke Neg Hx        Medications:    Current Outpatient Medications:   •  levothyroxine (SYNTHROID) 200 MCG Tab, TAKE ONE TABLET BY MOUTH EVERY MORNING ON AN EMPTY STOMACH, Disp: 30 Tablet, Rfl: 0  •  famotidine (PEPCID) 40 MG Tab, Take 1 Tablet by mouth every day., Disp: 90 Tablet, Rfl: 0  •  omeprazole (PRILOSEC) 20 MG delayed-release capsule, TAKE ONE CAPSULE BY MOUTH DAILY, Disp: 30 Capsule, Rfl: 5  •  lisinopril (PRINIVIL) 2.5 MG Tab, Take 1 Tablet by mouth every day., Disp: 30 Tablet, Rfl: 5  •  Empagliflozin-metFORMIN HCl ER (SYNJARDY XR)  MG TABLET SR 24 HR, Take 1 Tablet by mouth every day., Disp: 90 Tablet, Rfl: 1  •  topiramate (TOPAMAX) 50 MG tablet, TAKE ONE TABLET BY MOUTH DAILY (GENERIC FOR TOPAMAX)., Disp: 30 Tablet, Rfl: 5  •  furosemide (LASIX) 40 MG Tab, Take 1 Tablet by mouth  every day., Disp: 30 Tablet, Rfl: 0  •  sertraline (ZOLOFT) 100 MG Tab, TAKE TWO TABLETS BY MOUTH DAILY - ZOLOFT, Disp: 180 Tablet, Rfl: 3  •  LYRICA 150 MG Cap, , Disp: , Rfl:   •  traZODone (DESYREL) 50 MG Tab, TAKE ONE TABLET BY MOUTH AT BEDTIME. Pt to make appt prior to more refills., Disp: 90 tablet, Rfl: 0  •  HYDROcodone/acetaminophen (NORCO)  MG Tab, , Disp: , Rfl:   •  ibuprofen (MOTRIN) 800 MG Tab, TAKE ONE TABLET BY MOUTH EVERY 8 HOURS AS NEEDED, Disp: 270 Tab, Rfl: 0  •  cyclobenzaprine (FLEXERIL) 10 MG Tab, TAKE ONE TABLET BY MOUTH THREE TIMES A DAY AS NEEDED FOR MUSCLE SPASMS, Disp: 270 Tab, Rfl: 1    Labs: Reviewed    Physical Examination:  Vital signs: Wt (!) 131 kg (289 lb 3.2 oz)   BMI 46.68 kg/m²  Body mass index is 46.68 kg/m².    Assessment and Plan:    1. DM2  • A1c down to 7.6 today in clinic, FSB  • Pt is weight is down ~4 pounds since last visit   • Pt has had multiple insurance problems causing a gap in medication coverage. Synjardy is now too expensive. She started to take only metformin but experienced severe GI upset.   • PT tolerates Synjardy without any upset stomach    • Pt was given 2 weeks of jardiance 25 mg samples to bridge medication gap until the insurance barriers are removed  • She is willing to continue with metformin 1,000 mg twice daily as well  • Discussed how jardiance and metformin are the medications in synjardy and that they are not to be taken together    - Medication changes:  • Pt is to start jardiance 25 mg daily, samples provided until she can receive her synjardy    - Lifestyle changes:  • Pt to increase exercise tolerance to meet goal of 30 minutes daily  • Pt is to try and eat more vegetables and whole fruits and avoid juices and purees     Follow Up:  4 weeks    Sunshine Baum, student pharmacist    I have reviewed and concur with the above plan on 2022.      Sapna Melendrez    CC:   Sha Pereira M.D.

## 2022-05-02 DIAGNOSIS — F32.5 MAJOR DEPRESSION IN REMISSION (HCC): ICD-10-CM

## 2022-05-02 RX ORDER — TRAZODONE HYDROCHLORIDE 50 MG/1
TABLET ORAL
Qty: 90 TABLET | Refills: 0 | OUTPATIENT
Start: 2022-05-02

## 2022-05-02 RX ORDER — FUROSEMIDE 40 MG/1
40 TABLET ORAL DAILY
Qty: 30 TABLET | Refills: 0 | OUTPATIENT
Start: 2022-05-02

## 2022-05-11 ENCOUNTER — NON-PROVIDER VISIT (OUTPATIENT)
Dept: MEDICAL GROUP | Facility: CLINIC | Age: 57
End: 2022-05-11
Payer: OTHER GOVERNMENT

## 2022-05-11 ENCOUNTER — HOSPITAL ENCOUNTER (OUTPATIENT)
Facility: MEDICAL CENTER | Age: 57
End: 2022-05-11
Attending: INTERNAL MEDICINE
Payer: OTHER GOVERNMENT

## 2022-05-11 DIAGNOSIS — E03.9 ACQUIRED HYPOTHYROIDISM: ICD-10-CM

## 2022-05-11 DIAGNOSIS — E11.9 TYPE 2 DIABETES MELLITUS WITHOUT COMPLICATION, WITHOUT LONG-TERM CURRENT USE OF INSULIN (HCC): ICD-10-CM

## 2022-05-11 LAB
ALT SERPL-CCNC: 25 U/L (ref 2–50)
ANION GAP SERPL CALC-SCNC: 13 MMOL/L (ref 7–16)
BUN SERPL-MCNC: 19 MG/DL (ref 8–22)
CALCIUM SERPL-MCNC: 9.4 MG/DL (ref 8.5–10.5)
CHLORIDE SERPL-SCNC: 99 MMOL/L (ref 96–112)
CHOLEST SERPL-MCNC: 212 MG/DL (ref 100–199)
CO2 SERPL-SCNC: 24 MMOL/L (ref 20–33)
CREAT SERPL-MCNC: 0.96 MG/DL (ref 0.5–1.4)
EST. AVERAGE GLUCOSE BLD GHB EST-MCNC: 174 MG/DL
GFR SERPLBLD CREATININE-BSD FMLA CKD-EPI: 69 ML/MIN/1.73 M 2
GLUCOSE SERPL-MCNC: 182 MG/DL (ref 65–99)
HBA1C MFR BLD: 7.7 % (ref 4–5.6)
HDLC SERPL-MCNC: 70 MG/DL
LDLC SERPL CALC-MCNC: 116 MG/DL
POTASSIUM SERPL-SCNC: 4.2 MMOL/L (ref 3.6–5.5)
SODIUM SERPL-SCNC: 136 MMOL/L (ref 135–145)
TRIGL SERPL-MCNC: 131 MG/DL (ref 0–149)
TSH SERPL DL<=0.005 MIU/L-ACNC: 125 UIU/ML (ref 0.38–5.33)

## 2022-05-11 PROCEDURE — 82570 ASSAY OF URINE CREATININE: CPT

## 2022-05-11 PROCEDURE — 84443 ASSAY THYROID STIM HORMONE: CPT

## 2022-05-11 PROCEDURE — 80061 LIPID PANEL: CPT

## 2022-05-11 PROCEDURE — 84460 ALANINE AMINO (ALT) (SGPT): CPT

## 2022-05-11 PROCEDURE — 36415 COLL VENOUS BLD VENIPUNCTURE: CPT | Performed by: PHYSICIAN ASSISTANT

## 2022-05-11 PROCEDURE — 82043 UR ALBUMIN QUANTITATIVE: CPT

## 2022-05-11 PROCEDURE — 80048 BASIC METABOLIC PNL TOTAL CA: CPT

## 2022-05-11 PROCEDURE — 83036 HEMOGLOBIN GLYCOSYLATED A1C: CPT

## 2022-05-12 DIAGNOSIS — E78.5 DYSLIPIDEMIA: ICD-10-CM

## 2022-05-12 DIAGNOSIS — F32.5 MAJOR DEPRESSION IN REMISSION (HCC): ICD-10-CM

## 2022-05-12 DIAGNOSIS — E03.9 ACQUIRED HYPOTHYROIDISM: ICD-10-CM

## 2022-05-12 DIAGNOSIS — E11.9 TYPE 2 DIABETES MELLITUS WITHOUT COMPLICATION, WITHOUT LONG-TERM CURRENT USE OF INSULIN (HCC): ICD-10-CM

## 2022-05-12 DIAGNOSIS — Z00.00 WELL ADULT EXAM: ICD-10-CM

## 2022-05-12 LAB
CREAT UR-MCNC: 240.07 MG/DL
MICROALBUMIN UR-MCNC: 1.7 MG/DL
MICROALBUMIN/CREAT UR: 7 MG/G (ref 0–30)

## 2022-05-12 RX ORDER — LEVOTHYROXINE SODIUM 0.03 MG/1
25 TABLET ORAL
Qty: 90 TABLET | Refills: 3 | Status: SHIPPED
Start: 2022-05-12 | End: 2023-04-10

## 2022-05-12 RX ORDER — ATORVASTATIN CALCIUM 20 MG/1
20 TABLET, FILM COATED ORAL DAILY
Qty: 90 TABLET | Refills: 3 | Status: SHIPPED
Start: 2022-05-12 | End: 2023-04-10

## 2022-05-12 RX ORDER — EMPAGLIFLOZIN, METFORMIN HYDROCHLORIDE 25; 1000 MG/1; MG/1
1 TABLET, EXTENDED RELEASE ORAL DAILY
Qty: 90 TABLET | Refills: 3 | Status: SHIPPED
Start: 2022-05-12 | End: 2022-12-14

## 2022-05-12 RX ORDER — LEVOTHYROXINE SODIUM 0.2 MG/1
TABLET ORAL
Qty: 90 TABLET | Refills: 3 | Status: SHIPPED
Start: 2022-05-12 | End: 2023-04-10

## 2022-05-12 RX ORDER — LISINOPRIL 2.5 MG/1
2.5 TABLET ORAL DAILY
Qty: 90 TABLET | Refills: 3 | Status: SHIPPED
Start: 2022-05-12 | End: 2023-04-10

## 2022-05-13 RX ORDER — TRAZODONE HYDROCHLORIDE 50 MG/1
TABLET ORAL
Qty: 90 TABLET | Refills: 0 | Status: SHIPPED | OUTPATIENT
Start: 2022-05-13 | End: 2022-08-09 | Stop reason: SDUPTHER

## 2022-05-13 RX ORDER — TOPIRAMATE 50 MG/1
TABLET, FILM COATED ORAL
Qty: 90 TABLET | Refills: 0 | Status: SHIPPED | OUTPATIENT
Start: 2022-05-13 | End: 2022-11-21 | Stop reason: SDUPTHER

## 2022-05-26 ENCOUNTER — TELEPHONE (OUTPATIENT)
Dept: VASCULAR LAB | Facility: MEDICAL CENTER | Age: 57
End: 2022-05-26
Payer: OTHER GOVERNMENT

## 2022-05-26 NOTE — TELEPHONE ENCOUNTER
Called pt regarding missed pharmacotherapy appt - pt states she is now w/out ins.     Will f/u in 2 weeks to re-assess as she states her copays for these visits are becoming costly, but she does not wish to be discharged from the service.    Jay Arambula, PharmD, BCACP

## 2022-06-09 ENCOUNTER — TELEPHONE (OUTPATIENT)
Dept: VASCULAR LAB | Facility: MEDICAL CENTER | Age: 57
End: 2022-06-09
Payer: OTHER GOVERNMENT

## 2022-06-09 NOTE — TELEPHONE ENCOUNTER
Called pt regarding missed pharmacotherapy appt - pt states she is now w/out ins.      Will f/u in 2 weeks to re-assess as she states her copays for these visits are becoming costly, but she does not wish to be discharged from the service.    Called pt to re-assess the above - she asks for c/b in 2 weeks time.     Jay Arambula, OmegaD, BCACP

## 2022-06-23 NOTE — TELEPHONE ENCOUNTER
Called pt to f/u once more - no answer. LVM.    Will try back at a later time.    Jay Arambula, OmegaD, BCACP

## 2022-07-06 ENCOUNTER — TELEPHONE (OUTPATIENT)
Dept: VASCULAR LAB | Facility: MEDICAL CENTER | Age: 57
End: 2022-07-06
Payer: OTHER GOVERNMENT

## 2022-07-06 NOTE — TELEPHONE ENCOUNTER
Called pt regarding missed f/u pharmacotherapy appt - pt states she is now w/out ins.      Will f/u in 2 weeks to re-assess as she states her copays for these visits are becoming costly, but she does not wish to be discharged from the service.     Called pt to re-assess the above   LM to discuss  Will try again in 2 weeks     Claudia Gee, PharmD

## 2022-07-20 ENCOUNTER — DOCUMENTATION (OUTPATIENT)
Dept: VASCULAR LAB | Facility: MEDICAL CENTER | Age: 57
End: 2022-07-20
Payer: OTHER GOVERNMENT

## 2022-07-20 NOTE — PROGRESS NOTES
Attempted to reach patient to reschedule pharmacotherapy follow up.  Patient's  states she is with her elderly mother at the hospital, requests we call back later in the week.  Ralph Ventura, OmegaD, BCACP

## 2022-07-22 ENCOUNTER — TELEPHONE (OUTPATIENT)
Dept: VASCULAR LAB | Facility: MEDICAL CENTER | Age: 57
End: 2022-07-22
Payer: OTHER GOVERNMENT

## 2022-07-28 DIAGNOSIS — K21.9 GASTROESOPHAGEAL REFLUX DISEASE WITHOUT ESOPHAGITIS: ICD-10-CM

## 2022-07-28 RX ORDER — FAMOTIDINE 40 MG/1
40 TABLET, FILM COATED ORAL DAILY
Qty: 90 TABLET | Refills: 0 | Status: SHIPPED | OUTPATIENT
Start: 2022-07-28 | End: 2022-11-09 | Stop reason: SDUPTHER

## 2022-08-01 ENCOUNTER — TELEPHONE (OUTPATIENT)
Dept: VASCULAR LAB | Facility: MEDICAL CENTER | Age: 57
End: 2022-08-01
Payer: OTHER GOVERNMENT

## 2022-08-01 NOTE — TELEPHONE ENCOUNTER
Pt overdue for DM f/u  Last seen 04/2022    6th call    S/w pt to r/s f/u - she declined to r/s for now as she still is out of insurance    She will call us back to r/s - will await pt contact    Claudia Gee, OmegaD

## 2022-08-09 DIAGNOSIS — F32.5 MAJOR DEPRESSION IN REMISSION (HCC): ICD-10-CM

## 2022-08-09 RX ORDER — TRAZODONE HYDROCHLORIDE 50 MG/1
TABLET ORAL
Qty: 90 TABLET | Refills: 0 | Status: SHIPPED | OUTPATIENT
Start: 2022-08-09 | End: 2022-11-11 | Stop reason: SDUPTHER

## 2022-08-24 DIAGNOSIS — K21.9 GASTROESOPHAGEAL REFLUX DISEASE WITHOUT ESOPHAGITIS: ICD-10-CM

## 2022-11-09 DIAGNOSIS — K21.9 GASTROESOPHAGEAL REFLUX DISEASE WITHOUT ESOPHAGITIS: ICD-10-CM

## 2022-11-09 RX ORDER — FAMOTIDINE 40 MG/1
40 TABLET, FILM COATED ORAL DAILY
Qty: 90 TABLET | Refills: 0 | Status: SHIPPED | OUTPATIENT
Start: 2022-11-09 | End: 2023-02-09 | Stop reason: SDUPTHER

## 2022-11-10 NOTE — TELEPHONE ENCOUNTER
Received request via: Pharmacy    Was the patient seen in the last year in this department? No 10/27/2021    Does the patient have an active prescription (recently filled or refills available) for medication(s) requested? No    Does the patient have USP Plus and need 100 day supply (blood pressure, diabetes and cholesterol meds only)? Patient does not have SCP

## 2022-11-11 DIAGNOSIS — F32.5 MAJOR DEPRESSION IN REMISSION (HCC): ICD-10-CM

## 2022-11-11 RX ORDER — TRAZODONE HYDROCHLORIDE 50 MG/1
TABLET ORAL
Qty: 30 TABLET | Refills: 0 | Status: SHIPPED | OUTPATIENT
Start: 2022-11-11 | End: 2022-12-15 | Stop reason: SDUPTHER

## 2022-11-12 NOTE — TELEPHONE ENCOUNTER
Received request via: Pharmacy    Was the patient seen in the last year in this department? Yes    Does the patient have an active prescription (recently filled or refills available) for medication(s) requested? No    Does the patient have halfway Plus and need 100 day supply (blood pressure, diabetes and cholesterol meds only)? Patient does not have SCP

## 2022-11-18 ENCOUNTER — TELEPHONE (OUTPATIENT)
Dept: MEDICAL GROUP | Facility: PHYSICIAN GROUP | Age: 57
End: 2022-11-18
Payer: OTHER GOVERNMENT

## 2022-11-18 NOTE — TELEPHONE ENCOUNTER
Phone Number Called: [phone] (home)     Call outcome: Left detailed message for patient. Informed to call back with any additional questions.    Message: Lvm for pt to cb regarding refill request for trazodone. Pt requires an appointment prior to next refill.

## 2022-11-22 RX ORDER — TOPIRAMATE 50 MG/1
TABLET, FILM COATED ORAL
Qty: 90 TABLET | Refills: 0 | Status: SHIPPED | OUTPATIENT
Start: 2022-11-22 | End: 2023-02-21 | Stop reason: SDUPTHER

## 2022-12-12 DIAGNOSIS — F32.5 MAJOR DEPRESSION IN REMISSION (HCC): ICD-10-CM

## 2022-12-13 RX ORDER — TRAZODONE HYDROCHLORIDE 50 MG/1
TABLET ORAL
Qty: 30 TABLET | Refills: 0 | OUTPATIENT
Start: 2022-12-13

## 2022-12-13 NOTE — TELEPHONE ENCOUNTER
Received request via: Patient    Was the patient seen in the last year in this department? Yes    Does the patient have an active prescription (recently filled or refills available) for medication(s) requested? No    Does the patient have FPC Plus and need 100 day supply (blood pressure, diabetes and cholesterol meds only)? Patient does not have SCP

## 2022-12-14 ENCOUNTER — OFFICE VISIT (OUTPATIENT)
Dept: MEDICAL GROUP | Facility: PHYSICIAN GROUP | Age: 57
End: 2022-12-14
Payer: OTHER GOVERNMENT

## 2022-12-14 VITALS
DIASTOLIC BLOOD PRESSURE: 82 MMHG | HEART RATE: 69 BPM | RESPIRATION RATE: 18 BRPM | SYSTOLIC BLOOD PRESSURE: 130 MMHG | OXYGEN SATURATION: 98 % | TEMPERATURE: 97.7 F | BODY MASS INDEX: 39.37 KG/M2 | WEIGHT: 245 LBS | HEIGHT: 66 IN

## 2022-12-14 DIAGNOSIS — K21.9 GASTROESOPHAGEAL REFLUX DISEASE WITHOUT ESOPHAGITIS: Chronic | ICD-10-CM

## 2022-12-14 DIAGNOSIS — E78.5 DYSLIPIDEMIA: ICD-10-CM

## 2022-12-14 DIAGNOSIS — E66.01 MORBID OBESITY WITH BMI OF 45.0-49.9, ADULT (HCC): Chronic | ICD-10-CM

## 2022-12-14 DIAGNOSIS — E11.65 TYPE 2 DIABETES MELLITUS WITH HYPERGLYCEMIA, WITH LONG-TERM CURRENT USE OF INSULIN (HCC): ICD-10-CM

## 2022-12-14 DIAGNOSIS — E03.9 ACQUIRED HYPOTHYROIDISM: Chronic | ICD-10-CM

## 2022-12-14 DIAGNOSIS — Z79.4 TYPE 2 DIABETES MELLITUS WITH HYPERGLYCEMIA, WITH LONG-TERM CURRENT USE OF INSULIN (HCC): ICD-10-CM

## 2022-12-14 DIAGNOSIS — Z12.31 ENCOUNTER FOR SCREENING MAMMOGRAM FOR MALIGNANT NEOPLASM OF BREAST: ICD-10-CM

## 2022-12-14 DIAGNOSIS — F41.9 ANXIETY: Chronic | ICD-10-CM

## 2022-12-14 DIAGNOSIS — E55.9 VITAMIN D DEFICIENCY: ICD-10-CM

## 2022-12-14 DIAGNOSIS — Z79.891 CHRONIC USE OF OPIATE DRUG FOR THERAPEUTIC PURPOSE: Chronic | ICD-10-CM

## 2022-12-14 PROBLEM — E11.9 TYPE 2 DIABETES MELLITUS WITHOUT COMPLICATION, WITHOUT LONG-TERM CURRENT USE OF INSULIN (HCC): Chronic | Status: RESOLVED | Noted: 2019-06-04 | Resolved: 2022-12-14

## 2022-12-14 PROCEDURE — 99215 OFFICE O/P EST HI 40 MIN: CPT | Performed by: INTERNAL MEDICINE

## 2022-12-14 RX ORDER — GLIMEPIRIDE 2 MG/1
TABLET ORAL
COMMUNITY
End: 2023-04-10

## 2022-12-14 RX ORDER — IBUPROFEN 800 MG/1
TABLET ORAL
Qty: 90 TABLET | Refills: 1 | Status: SHIPPED | OUTPATIENT
Start: 2022-12-14 | End: 2023-05-15 | Stop reason: SDUPTHER

## 2022-12-14 RX ORDER — PANTOPRAZOLE SODIUM 40 MG/1
40 TABLET, DELAYED RELEASE ORAL DAILY
Qty: 90 TABLET | Refills: 3 | Status: SHIPPED
Start: 2022-12-14 | End: 2023-04-10

## 2022-12-14 ASSESSMENT — PATIENT HEALTH QUESTIONNAIRE - PHQ9: CLINICAL INTERPRETATION OF PHQ2 SCORE: 0

## 2022-12-14 NOTE — ASSESSMENT & PLAN NOTE
Body mass index is 39.54 kg/m².   Brief discussion with the patient regarding diet, exercise, and lifestyle modification to help achieve and maintain healthy weight            
Chronic condition.  The patient is taking atorvastatin.  Patient is due for lab test.  
This is a chronic condition.  The patient has been taking omeprazole and Pepcid.  The symptoms are not well controlled.  I strongly recommend referral to GI specialist for a consultation to consider EGD.  The patient declined.  Therefore at this time I have offered to give her a trial of pantoprazole 40 mg daily.  
This is a chronic condition.  The patient is currently taking Synthroid 225 MCG daily.  Patient is due for lab test which has been ordered today.  
This is a chronic condition.  The patient is presently not taking any medicine  Patient reported that she is intolerant to plain metformin  She was taking Synjardy previously however due to high cost of this medication patient has decided not to refill and if the prescription is not covered by her insurance.  Patient stated that she has been eating healthier.  Patient just had A1c done.  The result is not available at this time.  The patient will upload the information to the computer when available for us to review  Patient denies significant hypoglycemic symptoms.  
This is a chronic condition.  The patient is taking sertraline.  She also take trazodone.  No significant side effects reported.  Patient stated that she has been under some stress recently taking care of her mother with several medical conditions.  
This is a chronic condition.  The patient was seen previously by pain specialist.  Patient is status post 5 low back surgerIES previously.  Has not been able to see the pain specialist due to time taking care of mother  Patient is requesting short-term refill of ibuprofen until she sees the pain specialist.  Advised the patient that she is currently taking sertraline and that could be a significant drug interaction between ibuprofen and sertraline which may increase the risk of bleeding.  The patient voiced understanding.  The patient stated that she has had no issue or side effectS with these 2 medication in the past    Pt Requests refill of ibuprofen 800 mg  
Abdominal Pain, N/V/D

## 2022-12-14 NOTE — PROGRESS NOTES
PRIMARY CARE CLINIC VISIT    Chief complaint:    Follow-up diabetes, thyroid condition and anxiety  Refill ibuprofen  Follow-up hyperlipidemia and acid reflux    History of Present Illness     Type 2 diabetes mellitus with hyperglycemia (HCC)  This is a chronic condition.  The patient is presently not taking any medicine  Patient reported that she is intolerant to plain metformin  She was taking Synjardy previously however due to high cost of this medication patient has decided not to refill and if the prescription is not covered by her insurance.  Patient stated that she has been eating healthier.  Patient just had A1c done.  The result is not available at this time.  The patient will upload the information to the computer when available for us to review  Patient denies significant hypoglycemic symptoms.    Acquired hypothyroidism  This is a chronic condition.  The patient is currently taking Synthroid 225 MCG daily.  Patient is due for lab test which has been ordered today.    Anxiety  This is a chronic condition.  The patient is taking sertraline.  She also take trazodone.  No significant side effects reported.  Patient stated that she has been under some stress recently taking care of her mother with several medical conditions.    Chronic use of opiate drug for therapeutic purpose  This is a chronic condition.  The patient was seen previously by pain specialist.  Patient is status post 5 low back surgerIES previously.  Has not been able to see the pain specialist due to time taking care of mother  Patient is requesting short-term refill of ibuprofen until she sees the pain specialist.  Advised the patient that she is currently taking sertraline and that could be a significant drug interaction between ibuprofen and sertraline which may increase the risk of bleeding.  The patient voiced understanding.  The patient stated that she has had no issue or side effectS with these 2 medication in the past    Pt Requests  refill of ibuprofen 800 mg    Dyslipidemia  Chronic condition.  The patient is taking atorvastatin.  Patient is due for lab test.    GERD (gastroesophageal reflux disease)  This is a chronic condition.  The patient has been taking omeprazole and Pepcid.  The symptoms are not well controlled.  I strongly recommend referral to GI specialist for a consultation to consider EGD.  The patient declined.  Therefore at this time I have offered to give her a trial of pantoprazole 40 mg daily.    Morbid obesity with BMI of 45.0-49.9, adult ([facility])  Body mass index is 39.54 kg/m².   Brief discussion with the patient regarding diet, exercise, and lifestyle modification to help achieve and maintain healthy weight              Current Outpatient Medications on File Prior to Visit   Medication Sig Dispense Refill    topiramate (TOPAMAX) 50 MG tablet TAKE ONE TABLET BY MOUTH DAILY (GENERIC FOR TOPAMAX). 90 Tablet 0    traZODone (DESYREL) 50 MG Tab TAKE ONE TABLET BY MOUTH AT BEDTIME. 30 Tablet 0    famotidine (PEPCID) 40 MG Tab Take 1 Tablet by mouth every day. 90 Tablet 0    levothyroxine (SYNTHROID) 200 MCG Tab TAKE ONE TABLET BY MOUTH EVERY MORNING ON AN EMPTY STOMACH 90 Tablet 3    levothyroxine (SYNTHROID) 25 MCG Tab Take 1 Tablet by mouth every morning on an empty stomach. 90 Tablet 3    atorvastatin (LIPITOR) 20 MG Tab Take 1 Tablet by mouth every day. 90 Tablet 3    lisinopril (PRINIVIL) 2.5 MG Tab Take 1 Tablet by mouth every day. 90 Tablet 3    sertraline (ZOLOFT) 100 MG Tab TAKE TWO TABLETS BY MOUTH DAILY - ZOLOFT 180 Tablet 3    LYRICA 150 MG Cap       HYDROcodone/acetaminophen (NORCO)  MG Tab       cyclobenzaprine (FLEXERIL) 10 MG Tab TAKE ONE TABLET BY MOUTH THREE TIMES A DAY AS NEEDED FOR MUSCLE SPASMS 270 Tab 1     No current facility-administered medications on file prior to visit.        Allergies: Latex, Metformin, and Pregabalin    ROS  As per HPI above. All other systems reviewed and negative.      Past  "Medical, Social, and Family history reviewed and updated in EPIC     Objective     /82 (BP Location: Left arm, Patient Position: Sitting, BP Cuff Size: Large adult)   Pulse 69   Temp 36.5 °C (97.7 °F) (Temporal)   Resp 18   Ht 1.676 m (5' 6\")   Wt 111 kg (245 lb)   SpO2 98%    Body mass index is 39.54 kg/m².    General: alert in no apparent distress.  Cardiovascular: regular rate and rhythm  Pulmonary: lungs : no wheezing   Gastrointestinal: BS present. No obvious mass noted        Lab Results   Component Value Date/Time    HBA1C 7.7 (H) 05/11/2022 08:57 AM    HBA1C 7.6 (A) 04/27/2022 09:17 AM    HBA1C 8.9 (A) 03/02/2022 11:41 AM       Lab Results   Component Value Date/Time    WBC 6.6 02/24/2020 11:15 AM    HEMOGLOBIN 14.2 02/24/2020 11:15 AM    HEMATOCRIT 42.4 02/24/2020 11:15 AM    MCV 89.1 02/24/2020 11:15 AM    PLATELETCT 188 02/24/2020 11:15 AM         Lab Results   Component Value Date/Time    SODIUM 136 05/11/2022 08:57 AM    POTASSIUM 4.2 05/11/2022 08:57 AM    GLUCOSE 182 (H) 05/11/2022 08:57 AM    BUN 19 05/11/2022 08:57 AM    CREATININE 0.96 05/11/2022 08:57 AM       Lab Results   Component Value Date/Time    ALKPHOSPHAT 93 02/24/2020 11:15 AM    ASTSGOT 25 02/24/2020 11:15 AM    ALTSGPT 25 05/11/2022 08:57 AM    TBILIRUBIN 0.5 02/24/2020 11:15 AM       Lab Results   Component Value Date/Time    CHOLSTRLTOT 212 (H) 05/11/2022 08:57 AM     (H) 05/11/2022 08:57 AM    HDL 70 05/11/2022 08:57 AM    TRIGLYCERIDE 131 05/11/2022 08:57 AM         Assessment and Plan     1. Dyslipidemia  Chronic condition.  Patient is due for lab test.  Advised to continue atorvastatin.  Recommend low-fat low-cholesterol diet.  - Lipid Profile; Future    2. Type 2 diabetes mellitus with hyperglycemia, with long-term current use of insulin (HCC)  - MICROALBUMIN CREAT RATIO URINE; Future  - Basic Metabolic Panel; Future  Chronic condition.  Current control is unclear.  The patient stated that she had A1c lab done " recently.  The patient will provide us with the report when available.  As above the patient declined to take any medication due to high cost of medication.  Patient stated that she is intolerant to regular metformin.  A1c goal of 7% discussed.  Pt's education:   -Advised the  benefits of blood glucose monitoring, potential hypoglycemia , medication mode of action/ possible side effects, the effects of exercise, potential acute and chronic conditions related to diabetes.   -Discussed with pt regarding healthy diet and making better choices to help blood sugar.  -Also encouraged pt to continue with regular exercises/walking.       3. Vitamin D deficiency  - VITAMIN D,25 HYDROXY (DEFICIENCY); Future  Chronic condition.  The patient is due for lab test.  She is currently not taking any vitamin D supplementation      4. Acquired hypothyroidism  - TSH; Future  Chronic condition.  Lab tests ordered for follow-up.  Recommend to continue with Synthroid\    5. Anxiety  This is a chronic condition.  Advised the patient to continue with Synthroid.  TSH lab ordered.      6. Chronic use of opiate drug for therapeutic purpose  Chronic condition.  Advised the patient it is important to follow-up with pain specialist.  In the interim while waiting I have prescribed ibuprofen per patient request.  Potential side effect of the medication including potential bleeding especially when the patient is taking sertraline and also she has history of acid reflux.  The patient voiced understanding and wishes to get a refill of ibuprofen.        7. Gastroesophageal reflux disease without esophagitis  Chronic condition.  As above I recommended referral to for the patient to see GI specialist to consider EGD.  The patient declined.  A trial of Protonix 40 mg p.o. daily prescribed.  Recommend follow-up if not better.      8. Morbid obesity with BMI of 45.0-49.9, adult (HCC)  This is a chronic condition.  Recommend diet and exercise.  Encourage  weight loss        Other orders  - ibuprofen (MOTRIN) 800 MG Tab; TAKE ONE TABLET BY MOUTH EVERY 8 HOURS AS NEEDED Strength: 800 mg  Dispense: 90 Tablet; Refill: 1  - pantoprazole (PROTONIX) 40 MG Tablet Delayed Response; Take 1 Tablet by mouth every day.  Dispense: 90 Tablet; Refill: 3        Total time: 41  min -  That includes time for chart review before the visit, the actual patient visit, and time spent on documentation in EMR after the visit.  Chart review/prep, review of other providers' records, imaging/lab review, face-to-face time for history/examination, ordering, prescribing,  review of results/meds/ treatment plan with patient, and care coordination.               Healthcare Maintenance   Recommend monofilament testing to be done.  The patient declined    Mammogram requested  Discussed with the patient regarding colonoscopy.  The patient declined at this time            Please note that this dictation was created using voice recognition software. I have made every reasonable attempt to correct obvious errors, but I expect that there are errors of grammar and possibly content that I did not discover before finalizing the note.    Sha Pereira MD  Internal Medicine  [facility] care [facility]

## 2022-12-15 DIAGNOSIS — F32.5 MAJOR DEPRESSION IN REMISSION (HCC): ICD-10-CM

## 2022-12-15 RX ORDER — TRAZODONE HYDROCHLORIDE 50 MG/1
TABLET ORAL
Qty: 30 TABLET | Refills: 0 | Status: SHIPPED | OUTPATIENT
Start: 2022-12-15 | End: 2023-01-23 | Stop reason: SDUPTHER

## 2023-01-23 DIAGNOSIS — F32.5 MAJOR DEPRESSION IN REMISSION (HCC): ICD-10-CM

## 2023-01-23 RX ORDER — TRAZODONE HYDROCHLORIDE 50 MG/1
TABLET ORAL
Qty: 30 TABLET | Refills: 0 | Status: SHIPPED | OUTPATIENT
Start: 2023-01-23 | End: 2023-03-16 | Stop reason: SDUPTHER

## 2023-02-09 DIAGNOSIS — K21.9 GASTROESOPHAGEAL REFLUX DISEASE WITHOUT ESOPHAGITIS: ICD-10-CM

## 2023-02-09 DIAGNOSIS — F32.5 MAJOR DEPRESSION IN REMISSION (HCC): ICD-10-CM

## 2023-02-09 RX ORDER — FAMOTIDINE 40 MG/1
40 TABLET, FILM COATED ORAL DAILY
Qty: 90 TABLET | Refills: 0 | Status: SHIPPED
Start: 2023-02-09 | End: 2023-04-10

## 2023-02-09 RX ORDER — SERTRALINE HYDROCHLORIDE 100 MG/1
TABLET, FILM COATED ORAL
Qty: 180 TABLET | Refills: 0 | Status: SHIPPED
Start: 2023-02-09 | End: 2023-04-10

## 2023-02-21 RX ORDER — TOPIRAMATE 50 MG/1
TABLET, FILM COATED ORAL
Qty: 90 TABLET | Refills: 0 | Status: SHIPPED
Start: 2023-02-21 | End: 2023-04-10

## 2023-02-27 NOTE — TELEPHONE ENCOUNTER
Received request via: Patient    Was the patient seen in the last year in this department? Yes    Does the patient have an active prescription (recently filled or refills available) for medication(s) requested? No    Does the patient have retirement Plus and need 100 day supply (blood pressure, diabetes and cholesterol meds only)? Patient does not have SCP    
70

## 2023-03-16 DIAGNOSIS — F32.5 MAJOR DEPRESSION IN REMISSION (HCC): ICD-10-CM

## 2023-03-16 RX ORDER — TRAZODONE HYDROCHLORIDE 50 MG/1
TABLET ORAL
Qty: 30 TABLET | Refills: 2 | Status: SHIPPED
Start: 2023-03-16 | End: 2023-03-16

## 2023-03-16 RX ORDER — TRAZODONE HYDROCHLORIDE 50 MG/1
TABLET ORAL
Qty: 30 TABLET | Refills: 0 | Status: SHIPPED | OUTPATIENT
Start: 2023-03-16 | End: 2023-03-16

## 2023-03-16 RX ORDER — TRAZODONE HYDROCHLORIDE 50 MG/1
TABLET ORAL
Qty: 30 TABLET | Refills: 0 | OUTPATIENT
Start: 2023-03-16

## 2023-03-16 RX ORDER — TRAZODONE HYDROCHLORIDE 50 MG/1
TABLET ORAL
Qty: 30 TABLET | Refills: 2 | Status: SHIPPED | OUTPATIENT
Start: 2023-03-16 | End: 2023-03-16

## 2023-03-16 RX ORDER — TRAZODONE HYDROCHLORIDE 50 MG/1
TABLET ORAL
Qty: 30 TABLET | Refills: 2 | Status: SHIPPED | OUTPATIENT
Start: 2023-03-16 | End: 2023-04-10

## 2023-03-16 NOTE — TELEPHONE ENCOUNTER
Received request via: Patient    Was the patient seen in the last year in this department? Yes    Does the patient have an active prescription (recently filled or refills available) for medication(s) requested? No    Does the patient have care home Plus and need 100 day supply (blood pressure, diabetes and cholesterol meds only)? Patient does not have SCP      Patient comment: Dr. Pereira, my mother whom I was caregiving for  at the end of January- 5 days before my  went into the hospital with heart failure.  I cannot sleep without this medicine.  I am up and down 2-3 times a night for hours at a time.  The last fill was one fill with no refills.  I've taken this medicine for decades, it is mostly effective, and a known quantity as am I.  Please prescribe it with several refills as my constantly having to request these needed medicines is time consuming and

## 2023-03-31 NOTE — TELEPHONE ENCOUNTER
3/31 - scheduled for 4/14 - non fasting bloodwork Received request via: Patient    Was the patient seen in the last year in this department? Yes    Does the patient have an active prescription (recently filled or refills available) for medication(s) requested? No

## 2023-04-10 ENCOUNTER — OFFICE VISIT (OUTPATIENT)
Dept: MEDICAL GROUP | Facility: PHYSICIAN GROUP | Age: 58
End: 2023-04-10
Payer: OTHER GOVERNMENT

## 2023-04-10 VITALS
HEIGHT: 66 IN | OXYGEN SATURATION: 95 % | RESPIRATION RATE: 16 BRPM | BODY MASS INDEX: 38.89 KG/M2 | HEART RATE: 60 BPM | DIASTOLIC BLOOD PRESSURE: 72 MMHG | WEIGHT: 242 LBS | TEMPERATURE: 98.1 F | SYSTOLIC BLOOD PRESSURE: 128 MMHG

## 2023-04-10 DIAGNOSIS — E78.5 DYSLIPIDEMIA: Chronic | ICD-10-CM

## 2023-04-10 DIAGNOSIS — E11.65 TYPE 2 DIABETES MELLITUS WITH HYPERGLYCEMIA (HCC): ICD-10-CM

## 2023-04-10 DIAGNOSIS — F32.5 MAJOR DEPRESSION IN REMISSION (HCC): ICD-10-CM

## 2023-04-10 DIAGNOSIS — E03.9 ACQUIRED HYPOTHYROIDISM: Chronic | ICD-10-CM

## 2023-04-10 DIAGNOSIS — K21.9 GASTROESOPHAGEAL REFLUX DISEASE WITHOUT ESOPHAGITIS: Chronic | ICD-10-CM

## 2023-04-10 DIAGNOSIS — G43.809 OTHER MIGRAINE WITHOUT STATUS MIGRAINOSUS, NOT INTRACTABLE: Chronic | ICD-10-CM

## 2023-04-10 DIAGNOSIS — Z23 NEED FOR VACCINATION: ICD-10-CM

## 2023-04-10 DIAGNOSIS — Z12.11 COLON CANCER SCREENING: ICD-10-CM

## 2023-04-10 DIAGNOSIS — F33.1 MODERATE EPISODE OF RECURRENT MAJOR DEPRESSIVE DISORDER (HCC): ICD-10-CM

## 2023-04-10 DIAGNOSIS — E66.01 MORBID OBESITY WITH BMI OF 45.0-49.9, ADULT (HCC): Chronic | ICD-10-CM

## 2023-04-10 PROBLEM — F41.8 DEPRESSION WITH ANXIETY: Status: ACTIVE | Noted: 2018-07-19

## 2023-04-10 LAB
HBA1C MFR BLD: 5.8 % (ref ?–5.8)
POCT INT CON NEG: NEGATIVE
POCT INT CON POS: POSITIVE

## 2023-04-10 PROCEDURE — 99215 OFFICE O/P EST HI 40 MIN: CPT | Mod: 25 | Performed by: INTERNAL MEDICINE

## 2023-04-10 PROCEDURE — 90677 PCV20 VACCINE IM: CPT | Performed by: INTERNAL MEDICINE

## 2023-04-10 PROCEDURE — 83036 HEMOGLOBIN GLYCOSYLATED A1C: CPT | Performed by: INTERNAL MEDICINE

## 2023-04-10 PROCEDURE — 90471 IMMUNIZATION ADMIN: CPT | Performed by: INTERNAL MEDICINE

## 2023-04-10 RX ORDER — TRAZODONE HYDROCHLORIDE 50 MG/1
TABLET ORAL
Qty: 90 TABLET | Refills: 3 | Status: SHIPPED | OUTPATIENT
Start: 2023-04-10

## 2023-04-10 RX ORDER — PANTOPRAZOLE SODIUM 40 MG/1
40 TABLET, DELAYED RELEASE ORAL DAILY
Qty: 90 TABLET | Refills: 3 | Status: SHIPPED | OUTPATIENT
Start: 2023-04-10

## 2023-04-10 RX ORDER — LEVOTHYROXINE SODIUM 0.2 MG/1
TABLET ORAL
Qty: 90 TABLET | Refills: 3 | Status: SHIPPED | OUTPATIENT
Start: 2023-04-10

## 2023-04-10 RX ORDER — FAMOTIDINE 40 MG/1
40 TABLET, FILM COATED ORAL DAILY
Qty: 90 TABLET | Refills: 3 | Status: SHIPPED | OUTPATIENT
Start: 2023-04-10

## 2023-04-10 RX ORDER — LISINOPRIL 2.5 MG/1
2.5 TABLET ORAL DAILY
Qty: 90 TABLET | Refills: 3 | Status: SHIPPED | OUTPATIENT
Start: 2023-04-10

## 2023-04-10 RX ORDER — LEVOTHYROXINE SODIUM 0.03 MG/1
25 TABLET ORAL
Qty: 90 TABLET | Refills: 3 | Status: SHIPPED | OUTPATIENT
Start: 2023-04-10

## 2023-04-10 RX ORDER — TOPIRAMATE 50 MG/1
TABLET, FILM COATED ORAL
Qty: 90 TABLET | Refills: 3 | Status: SHIPPED | OUTPATIENT
Start: 2023-04-10

## 2023-04-10 RX ORDER — SERTRALINE HYDROCHLORIDE 100 MG/1
100 TABLET, FILM COATED ORAL DAILY
Qty: 90 TABLET | Refills: 3 | Status: SHIPPED | OUTPATIENT
Start: 2023-04-10 | End: 2023-08-08 | Stop reason: SDUPTHER

## 2023-04-10 RX ORDER — ATORVASTATIN CALCIUM 20 MG/1
20 TABLET, FILM COATED ORAL DAILY
Qty: 90 TABLET | Refills: 3 | Status: SHIPPED | OUTPATIENT
Start: 2023-04-10

## 2023-04-10 ASSESSMENT — PATIENT HEALTH QUESTIONNAIRE - PHQ9
SUM OF ALL RESPONSES TO PHQ QUESTIONS 1-9: 14
CLINICAL INTERPRETATION OF PHQ2 SCORE: 5
5. POOR APPETITE OR OVEREATING: 0 - NOT AT ALL

## 2023-04-10 NOTE — ASSESSMENT & PLAN NOTE
Recommended Pt to get shingles vaccine from the pharmacy as this is not available in the office.  Pt to  get 2 doses 2 to 6 months apart

## 2023-04-10 NOTE — ASSESSMENT & PLAN NOTE
Chronic condition.  The patient is presently taking sertraline 100 mg daily.  Patient denies SI.  Counseling given in the office today.  I also offered to refer the patient to behavioral health for therapy.  Patient declined.

## 2023-04-10 NOTE — ASSESSMENT & PLAN NOTE
This is a chronic condition.  Patient being treated with atorvastatin 20 Mg daily.  Lab test ordered for follow-up.

## 2023-04-10 NOTE — ASSESSMENT & PLAN NOTE
Chronic ongoing condition.  The patient is currently taking Zoloft 100 mg daily.  Patient tolerating medication well no significant side effects noted.

## 2023-04-10 NOTE — ASSESSMENT & PLAN NOTE
Chronic condition.    Body mass index is 39.06 kg/m².     Counseling on health consequences related to obesity.  Discussed with the patient regarding diet, exercise, and lifestyle modification to help achieve and maintain healthy weight

## 2023-04-10 NOTE — ASSESSMENT & PLAN NOTE
Chronic condition.  Patient is taking pantoprazole 40 Mg daily.  Patient denies nausea vomiting dysphagia or unexplained weight loss.

## 2023-04-10 NOTE — ASSESSMENT & PLAN NOTE
Chronic condition.  The patient takes Topamax 50 mg daily.  The patient denies significant migraine flareup

## 2023-04-10 NOTE — ASSESSMENT & PLAN NOTE
This is a chronic condition.   Patient reported that she was prescribed Synjardy previously.  However due to high cost of the medication the patient had decided not to fill the prescription.  Patient is intolerant to metformin.  Hemoglobin A1c today 5.8%

## 2023-04-10 NOTE — ASSESSMENT & PLAN NOTE
This is a chronic condition.  The patient is presently taking levothyroxine 225 mcg daily.  Lab test ordered for follow-up.

## 2023-04-10 NOTE — PROGRESS NOTES
PRIMARY CARE CLINIC VISIT    Chief complaint:    Follow-up diabetes  Depression  Hyperlipidemia  GERD  Follow-up hypothyroidism  Immunization update pneumonia vaccination    History of Present Illness     Type 2 diabetes mellitus with hyperglycemia (HCC)  This is a chronic condition.   Patient reported that she was prescribed Synjardy previously.  However due to high cost of the medication the patient had decided not to fill the prescription.  Patient is intolerant to metformin.  Hemoglobin A1c today 5.8%        Acquired hypothyroidism  This is a chronic condition.  The patient is presently taking levothyroxine 225 mcg daily.  Lab test ordered for follow-up.      Dyslipidemia  This is a chronic condition.  Patient being treated with atorvastatin 20 Mg daily.  Lab test ordered for follow-up.    GERD (gastroesophageal reflux disease)  Chronic condition.  Patient is taking pantoprazole 40 Mg daily.  Patient denies nausea vomiting dysphagia or unexplained weight loss.    Morbid obesity with BMI of 45.0-49.9, adult (HCC)  Chronic condition.    Body mass index is 39.06 kg/m².     Counseling on health consequences related to obesity.  Discussed with the patient regarding diet, exercise, and lifestyle modification to help achieve and maintain healthy weight          Need for vaccination  Recommended Pt to get shingles vaccine from the pharmacy as this is not available in the office.  Pt to  get 2 doses 2 to 6 months apart  Pneumonia vaccination ordered today.      Moderate episode of recurrent major depressive disorder (HCC)  Chronic condition.  The patient is presently taking sertraline 100 mg daily.  Patient denies SI.  Counseling given in the office today.  I also offered to refer the patient to behavioral health for therapy.  Patient declined.    Other migraine without status migrainosus, not intractable  Chronic condition.  The patient takes Topamax 50 mg daily.  The patient denies significant migraine  flareup    Current Outpatient Medications on File Prior to Visit   Medication Sig Dispense Refill    ibuprofen (MOTRIN) 800 MG Tab TAKE ONE TABLET BY MOUTH EVERY 8 HOURS AS NEEDED Strength: 800 mg 90 Tablet 1     No current facility-administered medications on file prior to visit.        Allergies: Latex, Metformin, and Pregabalin    Current Outpatient Medications Ordered in Epic   Medication Sig Dispense Refill    traZODone (DESYREL) 50 MG Tab TAKE ONE TABLET BY MOUTH AT BEDTIME. 90 Tablet 3    topiramate (TOPAMAX) 50 MG tablet TAKE ONE TABLET BY MOUTH DAILY (GENERIC FOR TOPAMAX). 90 Tablet 3    famotidine (PEPCID) 40 MG Tab Take 1 Tablet by mouth every day. 90 Tablet 3    sertraline (ZOLOFT) 100 MG Tab Take 1 Tablet by mouth every day. 90 Tablet 3    pantoprazole (PROTONIX) 40 MG Tablet Delayed Response Take 1 Tablet by mouth every day. 90 Tablet 3    levothyroxine (SYNTHROID) 200 MCG Tab TAKE ONE TABLET BY MOUTH EVERY MORNING ON AN EMPTY STOMACH 90 Tablet 3    levothyroxine (SYNTHROID) 25 MCG Tab Take 1 Tablet by mouth every morning on an empty stomach. 90 Tablet 3    atorvastatin (LIPITOR) 20 MG Tab Take 1 Tablet by mouth every day. 90 Tablet 3    lisinopril (PRINIVIL) 2.5 MG Tab Take 1 Tablet by mouth every day. 90 Tablet 3    ibuprofen (MOTRIN) 800 MG Tab TAKE ONE TABLET BY MOUTH EVERY 8 HOURS AS NEEDED Strength: 800 mg 90 Tablet 1     No current Epic-ordered facility-administered medications on file.       Past Medical History:   Diagnosis Date    Anxiety disorder 2/1/2011    Family history of diabetes mellitus (DM) 2/1/2011    GERD (gastroesophageal reflux disease) 2/1/2011    Migraine headache 2/1/2011    PCOS (polycystic ovarian syndrome) 2/1/2011       Past Surgical History:   Procedure Laterality Date    ABDOMINAL EXPLORATION      CERVICAL DISK AND FUSION ANTERIOR      LAPAROSCOPY      LUMBAR LAMINECTOMY DISKECTOMY      TONSILLECTOMY AND ADENOIDECTOMY         Family History   Problem Relation Age of  "Onset    Hyperlipidemia Mother     Cancer Mother         ? vocal cord    Other Mother         multi organ failure/cardiac arrest    Diabetes Sister     Hypertension Sister     Hyperlipidemia Sister     Diabetes Brother     Hypertension Brother     Hyperlipidemia Brother     Heart Disease Maternal Aunt     Heart Disease Maternal Grandmother     Diabetes Paternal Grandmother     Stroke Neg Hx        Social History     Tobacco Use   Smoking Status Never   Smokeless Tobacco Never   Tobacco Comments    avoid all tobacco products       Social History     Substance and Sexual Activity   Alcohol Use No    Alcohol/week: 0.0 oz    Comment: occasionally       Review of systems.  As per HPI above. All other systems reviewed and negative.      Past Medical, Social, and Family history reviewed and updated in EPIC     Objective     /72 (BP Location: Right arm, Patient Position: Sitting, BP Cuff Size: Large adult)   Pulse 60   Temp 36.7 °C (98.1 °F) (Temporal)   Resp 16   Ht 1.676 m (5' 6\")   Wt 110 kg (242 lb)   SpO2 95%    Body mass index is 39.06 kg/m².    General: alert in no apparent distress.  Cardiovascular: regular rate and rhythm  Pulmonary: lungs : no wheezing   Gastrointestinal: BS present. No obvious mass noted  Monofilament testing with a 10 gram force: sensation intact: intact bilaterally  Visual Inspection: Feet without maceration, ulcers, fissures.  Pedal pulses: intact bilaterally       Lab Results   Component Value Date/Time    HBA1C 7.7 (H) 05/11/2022 08:57 AM    HBA1C 7.6 (A) 04/27/2022 09:17 AM    HBA1C 8.9 (A) 03/02/2022 11:41 AM       Lab Results   Component Value Date/Time    WBC 6.6 02/24/2020 11:15 AM    HEMOGLOBIN 14.2 02/24/2020 11:15 AM    HEMATOCRIT 42.4 02/24/2020 11:15 AM    MCV 89.1 02/24/2020 11:15 AM    PLATELETCT 188 02/24/2020 11:15 AM         Lab Results   Component Value Date/Time    SODIUM 136 05/11/2022 08:57 AM    POTASSIUM 4.2 05/11/2022 08:57 AM    GLUCOSE 182 (H) 05/11/2022 " 08:57 AM    BUN 19 05/11/2022 08:57 AM    CREATININE 0.96 05/11/2022 08:57 AM       Lab Results   Component Value Date/Time    CHOLSTRLTOT 212 (H) 05/11/2022 08:57 AM     (H) 05/11/2022 08:57 AM    HDL 70 05/11/2022 08:57 AM    TRIGLYCERIDE 131 05/11/2022 08:57 AM       Lab Results   Component Value Date/Time    ALTSGPT 25 05/11/2022 08:57 AM             Assessment and Plan     1. Type 2 diabetes mellitus with hyperglycemia (HCC)  - POCT Hemoglobin A1C  Chronic condition.  Excellent control.  A1c 5.8% today.  Result discussed with the patient.  A1c goal of 7% discussed.  Basic physiology of Diabetes was explained to patient as well as possible microvascular/macrovascular complications.  Pt's education:   The importance of increasing physical activity to improve diabetes control was discussed with the patient.   Patient was also educated on changing diet and making better choices to help control blood sugar.   Discussed FBG goal of 100-120, 2hr PP <180       2. Acquired hypothyroidism  Chronic condition.  Current status unclear.  TSH ordered for follow-up.    3. Dyslipidemia  Chronic condition.  Current status unclear.  Advised the patient to continue atorvastatin 20 Mg daily.  Lab tests ordered for follow-up     4. Gastroesophageal reflux disease without esophagitis  - famotidine (PEPCID) 40 MG Tab; Take 1 Tablet by mouth every day.  Dispense: 90 Tablet; Refill: 3  This is a chronic and stable condition.  Continue with Pepcid 40 Mg daily.    5. Moderate episode of recurrent major depressive disorder (HCC)  - sertraline (ZOLOFT) 100 MG Tab; Take 1 Tablet by mouth every day.  Dispense: 90 Tablet; Refill: 3  Chronic condition.  Extended Counseling given in the office today.  Continue with sertraline 100 mg daily.  I offered to refer the patient to behavioral health.  Patient declined.    6. Morbid obesity with BMI of 45.0-49.9, adult (HCC)  Chronic condition.  Uncontrolled.  Recommend healthy diet and exercise.   Encouraged the patient to lose weight.      7. Need for vaccination  - Pneumococcal Conjugate Vaccine 20-Valent (19 yrs+)    8.  Insomnia chronic stable condition.  Continue with trazodone 50 mg daily.  - traZODone (DESYREL) 50 MG Tab; TAKE ONE TABLET BY MOUTH AT BEDTIME.  Dispense: 90 Tablet; Refill: 3    9. Colon cancer screening  - COLOGUARD (FIT DNA)    10. Other migraine without status migrainosus, not intractable  Chronic stable condition per continue with Topamax 50 mg daily.        Other orders  - topiramate (TOPAMAX) 50 MG tablet; TAKE ONE TABLET BY MOUTH DAILY (GENERIC FOR TOPAMAX).  Dispense: 90 Tablet; Refill: 3  - pantoprazole (PROTONIX) 40 MG Tablet Delayed Response; Take 1 Tablet by mouth every day.  Dispense: 90 Tablet; Refill: 3  - levothyroxine (SYNTHROID) 200 MCG Tab; TAKE ONE TABLET BY MOUTH EVERY MORNING ON AN EMPTY STOMACH  Dispense: 90 Tablet; Refill: 3  - levothyroxine (SYNTHROID) 25 MCG Tab; Take 1 Tablet by mouth every morning on an empty stomach.  Dispense: 90 Tablet; Refill: 3  - atorvastatin (LIPITOR) 20 MG Tab; Take 1 Tablet by mouth every day.  Dispense: 90 Tablet; Refill: 3  - lisinopril (PRINIVIL) 2.5 MG Tab; Take 1 Tablet by mouth every day.  Dispense: 90 Tablet; Refill: 3        Attestation: I spent:   48   min -  That includes time for chart review before the visit, the actual patient visit, and time spent on documentation in EMR after the visit.  Chart review/prep, review of other providers' records, imaging/lab review, face-to-face time for history/examination, ordering, prescribing,  review of results/meds/ treatment plan with patient, and care coordination.    The patient is of extensive complexity. This pt is at high risk for complications and morbidity.                 Please note that this dictation was created using voice recognition software. I have made every reasonable attempt to correct obvious errors, but I expect that there are errors of grammar and possibly content  that I did not discover before finalizing the note.    Sha Pereira MD  Internal Medicine  [facility]

## 2023-05-15 DIAGNOSIS — F33.1 MODERATE EPISODE OF RECURRENT MAJOR DEPRESSIVE DISORDER (HCC): ICD-10-CM

## 2023-05-15 RX ORDER — TRAZODONE HYDROCHLORIDE 50 MG/1
TABLET ORAL
Qty: 90 TABLET | Refills: 3 | Status: CANCELLED | OUTPATIENT
Start: 2023-05-15

## 2023-05-16 RX ORDER — IBUPROFEN 800 MG/1
TABLET ORAL
Qty: 90 TABLET | Refills: 1 | Status: SHIPPED | OUTPATIENT
Start: 2023-05-16 | End: 2023-08-30

## 2023-08-08 DIAGNOSIS — F32.5 MAJOR DEPRESSION IN REMISSION (HCC): ICD-10-CM

## 2023-08-08 RX ORDER — SERTRALINE HYDROCHLORIDE 100 MG/1
100 TABLET, FILM COATED ORAL 2 TIMES DAILY
Qty: 180 TABLET | Refills: 3 | Status: SHIPPED | OUTPATIENT
Start: 2023-08-08 | End: 2023-08-09

## 2023-08-08 NOTE — TELEPHONE ENCOUNTER
Received request via: Patient    Was the patient seen in the last year in this department? Yes    Does the patient have an active prescription (recently filled or refills available) for medication(s) requested? No    Does the patient have [facility] and need 100 day supply (blood pressure, diabetes and cholesterol meds only)? Patient does not have SCP    Patient reports she take 2 tablets in the morning and would like her Rx to reflect that in the sig.     Can you please advise.    Thank you,  Mabel Santos  Medical Assistant

## 2023-08-09 DIAGNOSIS — F32.5 MAJOR DEPRESSION IN REMISSION (HCC): ICD-10-CM

## 2023-08-09 RX ORDER — SERTRALINE HYDROCHLORIDE 100 MG/1
TABLET, FILM COATED ORAL
Qty: 180 TABLET | Refills: 3 | Status: SHIPPED | OUTPATIENT
Start: 2023-08-09

## 2023-08-30 RX ORDER — IBUPROFEN 800 MG/1
TABLET ORAL
Qty: 90 TABLET | Refills: 1 | Status: SHIPPED | OUTPATIENT
Start: 2023-08-30 | End: 2023-12-28

## 2023-12-28 RX ORDER — IBUPROFEN 800 MG/1
TABLET ORAL
Qty: 90 TABLET | Refills: 0 | Status: SHIPPED | OUTPATIENT
Start: 2023-12-28

## 2024-05-01 ENCOUNTER — TELEMEDICINE (OUTPATIENT)
Dept: MEDICAL GROUP | Facility: PHYSICIAN GROUP | Age: 59
End: 2024-05-01
Payer: OTHER GOVERNMENT

## 2024-05-01 VITALS — HEIGHT: 66 IN | BODY MASS INDEX: 40.66 KG/M2 | WEIGHT: 253 LBS

## 2024-05-01 DIAGNOSIS — E11.65 TYPE 2 DIABETES MELLITUS WITH HYPERGLYCEMIA, WITHOUT LONG-TERM CURRENT USE OF INSULIN (HCC): ICD-10-CM

## 2024-05-01 DIAGNOSIS — K21.9 GASTROESOPHAGEAL REFLUX DISEASE WITHOUT ESOPHAGITIS: Chronic | ICD-10-CM

## 2024-05-01 DIAGNOSIS — E03.9 ACQUIRED HYPOTHYROIDISM: Chronic | ICD-10-CM

## 2024-05-01 DIAGNOSIS — Z12.31 ENCOUNTER FOR SCREENING MAMMOGRAM FOR MALIGNANT NEOPLASM OF BREAST: ICD-10-CM

## 2024-05-01 DIAGNOSIS — Z12.11 COLON CANCER SCREENING: ICD-10-CM

## 2024-05-01 DIAGNOSIS — Z11.4 ENCOUNTER FOR SCREENING FOR HIV: ICD-10-CM

## 2024-05-01 DIAGNOSIS — F33.1 MODERATE EPISODE OF RECURRENT MAJOR DEPRESSIVE DISORDER (HCC): ICD-10-CM

## 2024-05-01 PROCEDURE — 99214 OFFICE O/P EST MOD 30 MIN: CPT | Performed by: PHYSICIAN ASSISTANT

## 2024-05-01 PROCEDURE — 96127 BRIEF EMOTIONAL/BEHAV ASSMT: CPT | Performed by: PHYSICIAN ASSISTANT

## 2024-05-01 RX ORDER — IBUPROFEN 800 MG/1
TABLET ORAL
Qty: 90 TABLET | Refills: 0 | Status: SHIPPED | OUTPATIENT
Start: 2024-05-01

## 2024-05-01 RX ORDER — FAMOTIDINE 40 MG/1
40 TABLET, FILM COATED ORAL DAILY
Qty: 90 TABLET | Refills: 1 | Status: SHIPPED | OUTPATIENT
Start: 2024-05-01

## 2024-05-01 RX ORDER — TOPIRAMATE 50 MG/1
TABLET, FILM COATED ORAL
Qty: 90 TABLET | Refills: 1 | Status: SHIPPED | OUTPATIENT
Start: 2024-05-01

## 2024-05-01 RX ORDER — TRAZODONE HYDROCHLORIDE 50 MG/1
TABLET ORAL
Qty: 90 TABLET | Refills: 1 | Status: SHIPPED | OUTPATIENT
Start: 2024-05-01

## 2024-05-01 ASSESSMENT — PATIENT HEALTH QUESTIONNAIRE - PHQ9
8. MOVING OR SPEAKING SO SLOWLY THAT OTHER PEOPLE COULD HAVE NOTICED. OR THE OPPOSITE, BEING SO FIGETY OR RESTLESS THAT YOU HAVE BEEN MOVING AROUND A LOT MORE THAN USUAL: NOT AT ALL
SUM OF ALL RESPONSES TO PHQ9 QUESTIONS 1 AND 2: 2
7. TROUBLE CONCENTRATING ON THINGS, SUCH AS READING THE NEWSPAPER OR WATCHING TELEVISION: NOT AT ALL
6. FEELING BAD ABOUT YOURSELF - OR THAT YOU ARE A FAILURE OR HAVE LET YOURSELF OR YOUR FAMILY DOWN: NOT AL ALL
5. POOR APPETITE OR OVEREATING: NOT AT ALL
4. FEELING TIRED OR HAVING LITTLE ENERGY: SEVERAL DAYS
9. THOUGHTS THAT YOU WOULD BE BETTER OFF DEAD, OR OF HURTING YOURSELF: NOT AT ALL
1. LITTLE INTEREST OR PLEASURE IN DOING THINGS: NOT AT ALL
SUM OF ALL RESPONSES TO PHQ QUESTIONS 1-9: 4
3. TROUBLE FALLING OR STAYING ASLEEP OR SLEEPING TOO MUCH: SEVERAL DAYS
2. FEELING DOWN, DEPRESSED, IRRITABLE, OR HOPELESS: MORE THAN HALF THE DAYS

## 2024-05-01 ASSESSMENT — ENCOUNTER SYMPTOMS
HEARTBURN: 1
INSOMNIA: 1
DEPRESSION: 1

## 2024-05-01 NOTE — TELEPHONE ENCOUNTER
Received request via: Pharmacy    Was the patient seen in the last year in this department? No    Does the patient have an active prescription (recently filled or refills available) for medication(s) requested?  Patient req 90 day supply    Pharmacy Name: [id number] - [place], [place] - [address] [place] [facility] [address]     Does the patient have snf Plus and need 100 day supply (blood pressure, diabetes and cholesterol meds only)? Patient does not have SCP

## 2024-05-01 NOTE — PROGRESS NOTES
Virtual Visit: Established Patient   This visit was conducted via Zoom using secure and encrypted videoconferencing technology.   The patient was in their home in the state of [place].    The patient's identity was confirmed and verbal consent was obtained for this virtual visit.     Subjective:   CC:   Chief Complaint   Patient presents with    Medication Refill       Gail Elias is a 58 y.o. female presenting for evaluation and management of:    Patient is here today requesting medication refills.  States that she has not been seen by her PCP in over a year and her medication refills were denied.  Patient is had a difficult time over the last 2 years with multiple losses in her family and states that she has not prioritized her own health.  Would like to get caught up on her blood work.      ROS   Review of Systems   Constitutional:  Positive for malaise/fatigue.   Gastrointestinal:  Positive for heartburn.   Psychiatric/Behavioral:  Positive for depression. The patient has insomnia.    All other systems reviewed and are negative.      Current medicines (including changes today)  Current Outpatient Medications   Medication Sig Dispense Refill    ibuprofen (MOTRIN) 800 MG Tab TAKE 1 TABLET BY MOUTH THREE TIMES DAILY EVERY 8 HOURS AS NEEDED 90 Tablet 0    famotidine (PEPCID) 40 MG Tab Take 1 Tablet by mouth every day. 90 Tablet 1    topiramate (TOPAMAX) 50 MG tablet TAKE ONE TABLET BY MOUTH DAILY (GENERIC FOR TOPAMAX). 90 Tablet 1    traZODone (DESYREL) 50 MG Tab TAKE ONE TABLET BY MOUTH AT BEDTIME. 90 Tablet 1    sertraline (ZOLOFT) 100 MG Tab Take 2 tabs in  Tablet 3    pantoprazole (PROTONIX) 40 MG Tablet Delayed Response Take 1 Tablet by mouth every day. 90 Tablet 3    levothyroxine (SYNTHROID) 200 MCG Tab TAKE ONE TABLET BY MOUTH EVERY MORNING ON AN EMPTY STOMACH 90 Tablet 3    levothyroxine (SYNTHROID) 25 MCG Tab Take 1 Tablet by mouth every morning on an empty stomach. 90 Tablet 3    atorvastatin  "(LIPITOR) 20 MG Tab Take 1 Tablet by mouth every day. 90 Tablet 3    lisinopril (PRINIVIL) 2.5 MG Tab Take 1 Tablet by mouth every day. 90 Tablet 3     No current facility-administered medications for this visit.       Patient Active Problem List    Diagnosis Date Noted    Need for vaccination 04/10/2023    Moderate episode of recurrent major depressive disorder (HCC) 04/10/2023    Dyslipidemia 12/14/2022    Type 2 diabetes mellitus with hyperglycemia ([facility]) 10/27/2021    Insomnia 07/06/2020    Morbid obesity with BMI of 45.0-49.9, adult ([facility]) 02/24/2020    Bilateral carpal tunnel syndrome 10/23/2018    DDD (degenerative disc disease), lumbar 09/26/2017    Chronic use of opiate drug for therapeutic purpose 02/17/2017    Acquired hypothyroidism 12/18/2015    Spondylosis of cervical region without myelopathy or radiculopathy 08/28/2015    Other migraine without status migrainosus, not intractable 08/28/2015    Fibromyalgia 04/04/2013    ENDOMETRIOSIS 09/13/2011    PCOS (polycystic ovarian syndrome) 02/01/2011    GERD (gastroesophageal reflux disease) 02/01/2011        Objective:   Ht 1.676 m (5' 6\") Comment: per pt .  Wt 115 kg (253 lb) Comment: per pt.  BMI 40.84 kg/m²     Physical Exam:  Constitutional: Alert, no distress, well-groomed.  Skin: No rashes in visible areas.  Eye: Round. Conjunctiva clear, lids normal. No icterus.   ENMT: Lips pink without lesions, good dentition, moist mucous membranes. Phonation normal.  Neck: No masses, no thyromegaly. Moves freely without pain.  Respiratory: Unlabored respiratory effort, no cough or audible wheeze  Psych: Alert and oriented x3, normal affect and mood.     Depression Screening    Little interest or pleasure in doing things?   Not at all  Feeling down, depressed , or hopeless?  More than half the days  Trouble falling or staying asleep, or sleeping too much?   Several days  Feeling tired or having little energy?   Several days  Poor appetite or overeating?   Not at " all  Feeling bad about yourself - or that you are a failure or have let yourself or your family down?  Not al all  Trouble concentrating on things, such as reading the newspaper or watching television?  Not at all  Moving or speaking so slowly that other people could have noticed.  Or the opposite - being so fidgety or restless that you have been moving around a lot more than usual?   Not at all  Thoughts that you would be better off dead, or of hurting yourself?   Not at all  Patient Health Questionnaire Score:  4      If depressive symptoms identified deferred to follow up visit unless specifically addressed in assesment and plan.      Assessment and Plan:   The following treatment plan was discussed:     1. Type 2 diabetes mellitus with hyperglycemia, without long-term current use of insulin (HCC)  Chronic, status unknown but previously uncontrolled.  Patient has not done any of her diabetic healthcare maintenance over the last year because she has had multiple family losses and has not prioritized her own health.  Blood work ordered today and patient was scheduled to follow-up with PCP in person to follow-up on the results and to get caught up on care gaps.   - HEMOGLOBIN A1C; Future  - Comp Metabolic Panel; Future  - Lipid Profile; Future  - MICROALB/CREAT RATIO RAND. UR    2. Acquired hypothyroidism  Chronic, status unknown but previously significantly uncontrolled.  Labs ordered to further evaluate and again patient will follow-up with PCP to discuss the results.  - TSH WITH REFLEX TO FT4; Future  - TRIIDOTHYRONINE; Future  - ANTITHYROGLOBULIN AB; Future    3. Gastroesophageal reflux disease without esophagitis  Chronic, stable. Refill sent in today.   - famotidine (PEPCID) 40 MG Tab; Take 1 Tablet by mouth every day.  Dispense: 90 Tablet; Refill: 1    4. Moderate episode of recurrent major depressive disorder (HCC)  Chronic, stable. Refill sent in today.  - traZODone (DESYREL) 50 MG Tab; TAKE ONE TABLET BY  MOUTH AT BEDTIME.  Dispense: 90 Tablet; Refill: 1    5. Encounter for screening mammogram for malignant neoplasm of breast  - MA-SCREENING MAMMO BILAT W/TOMOSYNTHESIS W/CAD; Future    6. Encounter for screening for HIV  - HIV AG/AB COMBO ASSAY SCREENING; Future    7. Colon cancer screening  - Referral to GI for Colonoscopy    Other orders  - topiramate (TOPAMAX) 50 MG tablet; TAKE ONE TABLET BY MOUTH DAILY (GENERIC FOR TOPAMAX).  Dispense: 90 Tablet; Refill: 1      Follow-up: Return for with PCP to follow up labs.

## 2024-05-06 RX ORDER — ATORVASTATIN CALCIUM 20 MG/1
20 TABLET, FILM COATED ORAL DAILY
Qty: 60 TABLET | Refills: 0 | Status: SHIPPED | OUTPATIENT
Start: 2024-05-06

## 2024-05-06 RX ORDER — LEVOTHYROXINE SODIUM 0.2 MG/1
TABLET ORAL
Qty: 60 TABLET | Refills: 0 | Status: SHIPPED | OUTPATIENT
Start: 2024-05-06

## 2024-05-06 RX ORDER — LEVOTHYROXINE SODIUM 0.03 MG/1
25 TABLET ORAL
Qty: 60 TABLET | Refills: 0 | Status: SHIPPED | OUTPATIENT
Start: 2024-05-06

## 2024-05-06 RX ORDER — LISINOPRIL 2.5 MG/1
2.5 TABLET ORAL DAILY
Qty: 60 TABLET | Refills: 0 | Status: SHIPPED | OUTPATIENT
Start: 2024-05-06

## 2024-05-06 NOTE — TELEPHONE ENCOUNTER
Received request via: Pharmacy    Was the patient seen in the last year in this department? Yes    Does the patient have an active prescription (recently filled or refills available) for medication(s) requested? No    Pharmacy Name: [facility]    Does the patient have [facility] and need 100 day supply (blood pressure, diabetes and cholesterol meds only)? Patient does not have SCP

## 2024-08-14 DIAGNOSIS — F33.1 MODERATE EPISODE OF RECURRENT MAJOR DEPRESSIVE DISORDER (HCC): ICD-10-CM

## 2024-08-15 RX ORDER — TRAZODONE HYDROCHLORIDE 50 MG/1
TABLET, FILM COATED ORAL
Qty: 60 TABLET | Refills: 0 | Status: SHIPPED | OUTPATIENT
Start: 2024-08-15

## 2024-08-15 RX ORDER — TOPIRAMATE 50 MG/1
TABLET, FILM COATED ORAL
Qty: 60 TABLET | Refills: 0 | Status: SHIPPED | OUTPATIENT
Start: 2024-08-15

## 2024-08-15 NOTE — TELEPHONE ENCOUNTER
Received request via: Pharmacy    Was the patient seen in the last year in this department? Yes    Does the patient have an active prescription (recently filled or refills available) for medication(s) requested? No    Pharmacy Name[facility]    Does the patient have [facility] and need 100-day supply? (This applies to ALL medications) Patient does not have SCP

## 2024-11-07 DIAGNOSIS — K21.9 GASTROESOPHAGEAL REFLUX DISEASE WITHOUT ESOPHAGITIS: Chronic | ICD-10-CM

## 2024-11-07 RX ORDER — FAMOTIDINE 40 MG/1
40 TABLET, FILM COATED ORAL DAILY
Qty: 60 TABLET | Refills: 0 | Status: SHIPPED | OUTPATIENT
Start: 2024-11-07

## 2024-11-07 RX ORDER — IBUPROFEN 800 MG/1
TABLET, FILM COATED ORAL
Qty: 60 TABLET | Refills: 0 | Status: SHIPPED | OUTPATIENT
Start: 2024-11-07

## 2024-11-07 NOTE — TELEPHONE ENCOUNTER
Received request via: Pharmacy    Was the patient seen in the last year in this department? Yes    Does the patient have an active prescription (recently filled or refills available) for medication(s) requested? No    Pharmacy Name:     [id number] - [place], [place] - [address] [place] [facility] [address] (Pharmacy) [phone]       Does the patient have custodial Plus and need 100-day supply? (This applies to ALL medications) Patient does not have SCP

## 2024-11-13 DIAGNOSIS — F33.1 MODERATE EPISODE OF RECURRENT MAJOR DEPRESSIVE DISORDER (HCC): ICD-10-CM

## 2024-11-13 RX ORDER — TOPIRAMATE 50 MG/1
TABLET, FILM COATED ORAL
Qty: 60 TABLET | Refills: 0 | Status: SHIPPED | OUTPATIENT
Start: 2024-11-13

## 2024-11-13 RX ORDER — TRAZODONE HYDROCHLORIDE 50 MG/1
TABLET, FILM COATED ORAL
Qty: 60 TABLET | Refills: 0 | Status: SHIPPED | OUTPATIENT
Start: 2024-11-13

## 2024-11-13 NOTE — TELEPHONE ENCOUNTER
Received request via: Pharmacy    Was the patient seen in the last year in this department? Yes    Does the patient have an active prescription (recently filled or refills available) for medication(s) requested? No    Pharmacy Name: [facility]     Does the patient have [facility] Plus and need 100-day supply? (This applies to ALL medications) Patient does not have SCP

## 2025-01-12 DIAGNOSIS — K21.9 GASTROESOPHAGEAL REFLUX DISEASE WITHOUT ESOPHAGITIS: Chronic | ICD-10-CM

## 2025-01-13 RX ORDER — IBUPROFEN 800 MG/1
TABLET, FILM COATED ORAL
Qty: 60 TABLET | Refills: 0 | Status: SHIPPED | OUTPATIENT
Start: 2025-01-13

## 2025-01-13 RX ORDER — FAMOTIDINE 40 MG/1
40 TABLET, FILM COATED ORAL DAILY
Qty: 60 TABLET | Refills: 0 | Status: SHIPPED | OUTPATIENT
Start: 2025-01-13

## 2025-01-13 NOTE — TELEPHONE ENCOUNTER
Received request via: Pharmacy    Was the patient seen in the last year in this department? Yes    Does the patient have an active prescription (recently filled or refills available) for medication(s) requested? No      Does the patient have long term Plus and need 100-day supply? (This applies to ALL medications) Patient does not have SCP

## 2025-02-21 DIAGNOSIS — F32.5 MAJOR DEPRESSION IN REMISSION (HCC): ICD-10-CM

## 2025-02-21 RX ORDER — SERTRALINE HYDROCHLORIDE 100 MG/1
TABLET, FILM COATED ORAL
Qty: 180 TABLET | Refills: 0 | Status: SHIPPED | OUTPATIENT
Start: 2025-02-21

## 2025-03-13 DIAGNOSIS — K21.9 GASTROESOPHAGEAL REFLUX DISEASE WITHOUT ESOPHAGITIS: Chronic | ICD-10-CM

## 2025-03-13 RX ORDER — FAMOTIDINE 40 MG/1
40 TABLET, FILM COATED ORAL DAILY
Qty: 90 TABLET | Refills: 0 | Status: SHIPPED | OUTPATIENT
Start: 2025-03-13

## 2025-03-13 NOTE — TELEPHONE ENCOUNTER
Please call patient to let her know that her 1 hour was elevated at 139.  She will need a 3 hour gtt.  Orders placed.  Please given her instruction     Received request via: Pharmacy    Was the patient seen in the last year in this department? Yes    Does the patient have an active prescription (recently filled or refills available) for medication(s) requested? No    Pharmacy Name:     [id number] - [place], [place] - [address] AT [facility] [address] (Pharmacy) [phone]       Does the patient have longterm Plus and need 100-day supply? (This applies to ALL medications) Patient does not have SCP

## 2025-03-20 DIAGNOSIS — F33.1 MODERATE EPISODE OF RECURRENT MAJOR DEPRESSIVE DISORDER (HCC): ICD-10-CM

## 2025-03-20 RX ORDER — TRAZODONE HYDROCHLORIDE 50 MG/1
TABLET ORAL
Qty: 60 TABLET | Refills: 0 | Status: SHIPPED | OUTPATIENT
Start: 2025-03-20

## 2025-03-20 RX ORDER — TOPIRAMATE 50 MG/1
TABLET, FILM COATED ORAL
Qty: 60 TABLET | Refills: 0 | Status: SHIPPED | OUTPATIENT
Start: 2025-03-20

## 2025-03-20 RX ORDER — TOPIRAMATE 50 MG/1
TABLET, FILM COATED ORAL
Qty: 60 TABLET | Refills: 0 | OUTPATIENT
Start: 2025-03-20

## 2025-03-20 RX ORDER — TRAZODONE HYDROCHLORIDE 50 MG/1
TABLET ORAL
Qty: 60 TABLET | Refills: 0 | OUTPATIENT
Start: 2025-03-20

## 2025-03-20 NOTE — TELEPHONE ENCOUNTER
Pt states she saw Mamta in May of this year.  She states she needs these meds to function and its hard for her to make the 200 mi round trip for an appointment.

## 2025-03-20 NOTE — TELEPHONE ENCOUNTER
Received request via: Patient    Was the patient seen in the last year in this department? Yes    Does the patient have an active prescription (recently filled or refills available) for medication(s) requested? No    Pharmacy Name:   [facility] #[id number] - [place] [address] AT [facility] [address]  [facility] [address]  [facility] [address]  Phone: [phone] Fax: [phone]        Does the patient have group home Plus and need 100-day supply? (This applies to ALL medications) Patient does not have SCP

## 2025-05-06 DIAGNOSIS — F33.1 MODERATE EPISODE OF RECURRENT MAJOR DEPRESSIVE DISORDER (HCC): ICD-10-CM

## 2025-05-06 DIAGNOSIS — K21.9 GASTROESOPHAGEAL REFLUX DISEASE WITHOUT ESOPHAGITIS: Chronic | ICD-10-CM

## 2025-05-06 DIAGNOSIS — F32.5 MAJOR DEPRESSION IN REMISSION (HCC): ICD-10-CM

## 2025-05-06 RX ORDER — TRAZODONE HYDROCHLORIDE 50 MG/1
TABLET ORAL
Qty: 60 TABLET | Refills: 0 | Status: SHIPPED | OUTPATIENT
Start: 2025-05-06

## 2025-05-06 RX ORDER — LEVOTHYROXINE SODIUM 200 UG/1
TABLET ORAL
Qty: 60 TABLET | Refills: 0 | Status: SHIPPED | OUTPATIENT
Start: 2025-05-06

## 2025-05-06 RX ORDER — LEVOTHYROXINE SODIUM 25 UG/1
25 TABLET ORAL
Qty: 60 TABLET | Refills: 0 | Status: SHIPPED | OUTPATIENT
Start: 2025-05-06

## 2025-05-06 RX ORDER — ATORVASTATIN CALCIUM 20 MG/1
20 TABLET, FILM COATED ORAL DAILY
Qty: 60 TABLET | Refills: 0 | Status: SHIPPED | OUTPATIENT
Start: 2025-05-06

## 2025-05-06 RX ORDER — TOPIRAMATE 50 MG/1
TABLET, FILM COATED ORAL
Qty: 60 TABLET | Refills: 0 | Status: SHIPPED | OUTPATIENT
Start: 2025-05-06

## 2025-05-06 RX ORDER — LISINOPRIL 2.5 MG/1
2.5 TABLET ORAL DAILY
Qty: 60 TABLET | Refills: 0 | Status: SHIPPED | OUTPATIENT
Start: 2025-05-06

## 2025-05-06 RX ORDER — FAMOTIDINE 40 MG/1
40 TABLET, FILM COATED ORAL DAILY
Qty: 60 TABLET | Refills: 0 | Status: SHIPPED | OUTPATIENT
Start: 2025-05-06

## 2025-05-06 RX ORDER — IBUPROFEN 800 MG/1
TABLET, FILM COATED ORAL
Qty: 60 TABLET | Refills: 0 | Status: SHIPPED | OUTPATIENT
Start: 2025-05-06 | End: 2025-05-27

## 2025-05-06 RX ORDER — PANTOPRAZOLE SODIUM 40 MG/1
40 TABLET, DELAYED RELEASE ORAL DAILY
Qty: 60 TABLET | Refills: 0 | Status: SHIPPED | OUTPATIENT
Start: 2025-05-06

## 2025-05-06 RX ORDER — SERTRALINE HYDROCHLORIDE 100 MG/1
TABLET, FILM COATED ORAL
Qty: 60 TABLET | Refills: 0 | Status: SHIPPED | OUTPATIENT
Start: 2025-05-06

## 2025-05-06 NOTE — TELEPHONE ENCOUNTER
Received request via: Patient    Was the patient seen in the last year in this department? No  Appointment schedule for 06/24/2025 at 1:25    Does the patient have an active prescription (recently filled or refills available) for medication(s) requested? No    [id number] - [place], [place] - [address] AT [facility] [address]     Does the patient have USP Plus and need 100-day supply? (This applies to ALL medications) Patient does not have SCP

## 2025-05-27 ENCOUNTER — TELEPHONE (OUTPATIENT)
Dept: MEDICAL GROUP | Facility: PHYSICIAN GROUP | Age: 60
End: 2025-05-27
Payer: OTHER GOVERNMENT

## 2025-05-27 RX ORDER — IBUPROFEN 800 MG/1
800 TABLET, FILM COATED ORAL EVERY 8 HOURS PRN
Qty: 60 TABLET | Refills: 1 | Status: SHIPPED | OUTPATIENT
Start: 2025-05-27

## 2025-05-27 NOTE — TELEPHONE ENCOUNTER
[facility] #[id number] - [place], [place] - [address] AT [facility] [address]  [place] [address]  Phone: [phone] Fax: [phone]    Pharmacy states rx has 2 sets of directions and they are asking for clarification.  They are also asking for a day supply.

## 2025-06-24 DIAGNOSIS — K21.9 GASTROESOPHAGEAL REFLUX DISEASE WITHOUT ESOPHAGITIS: Chronic | ICD-10-CM

## 2025-06-24 DIAGNOSIS — F33.1 MODERATE EPISODE OF RECURRENT MAJOR DEPRESSIVE DISORDER (HCC): ICD-10-CM

## 2025-06-24 DIAGNOSIS — F32.5 MAJOR DEPRESSION IN REMISSION (HCC): ICD-10-CM

## 2025-06-24 RX ORDER — LEVOTHYROXINE SODIUM 25 UG/1
25 TABLET ORAL
Qty: 60 TABLET | Refills: 0 | OUTPATIENT
Start: 2025-06-24

## 2025-06-24 RX ORDER — LISINOPRIL 2.5 MG/1
2.5 TABLET ORAL DAILY
Qty: 60 TABLET | Refills: 0 | OUTPATIENT
Start: 2025-06-24

## 2025-06-24 RX ORDER — TOPIRAMATE 50 MG/1
TABLET, FILM COATED ORAL
Qty: 60 TABLET | Refills: 0 | OUTPATIENT
Start: 2025-06-24

## 2025-06-24 RX ORDER — PANTOPRAZOLE SODIUM 40 MG/1
40 TABLET, DELAYED RELEASE ORAL DAILY
Qty: 60 TABLET | Refills: 0 | OUTPATIENT
Start: 2025-06-24

## 2025-06-24 RX ORDER — LEVOTHYROXINE SODIUM 200 UG/1
TABLET ORAL
Qty: 60 TABLET | Refills: 0 | OUTPATIENT
Start: 2025-06-24

## 2025-06-24 RX ORDER — SERTRALINE HYDROCHLORIDE 100 MG/1
TABLET, FILM COATED ORAL
Qty: 60 TABLET | Refills: 0 | OUTPATIENT
Start: 2025-06-24

## 2025-06-24 RX ORDER — FAMOTIDINE 40 MG/1
40 TABLET, FILM COATED ORAL DAILY
Qty: 60 TABLET | Refills: 0 | OUTPATIENT
Start: 2025-06-24

## 2025-06-24 RX ORDER — TRAZODONE HYDROCHLORIDE 50 MG/1
TABLET ORAL
Qty: 60 TABLET | Refills: 0 | OUTPATIENT
Start: 2025-06-24

## 2025-06-24 RX ORDER — ATORVASTATIN CALCIUM 20 MG/1
20 TABLET, FILM COATED ORAL DAILY
Qty: 60 TABLET | Refills: 0 | OUTPATIENT
Start: 2025-06-24

## 2025-06-24 NOTE — TELEPHONE ENCOUNTER
Patient was scheduled for 6/24 however provider has a meeting and pt needed to be rescheduled.   When I called patient to inform her that I needed to reschedule her I let her know that I could get her in with another provider same time today. She did not want to do that and stated that it has been 2.5 years and she wants to see her dr.   I informed her of the 1 year policy and I tried to schedule her for Thursday 6/26 but she said no. She is now scheduled for 7/11 and wants her refills sent to her pharmacy before her July appointment.

## 2025-06-24 NOTE — TELEPHONE ENCOUNTER
Received request via: Patient    Was the patient seen in the last year in this department? No    Does the patient have an active prescription (recently filled or refills available) for medication(s) requested? No    Pharmacy Name: [facility]    Does the patient have [facility] Plus and need 100-day supply? (This applies to ALL medications) Patient does not have SCP      PATIENT WAS SCHEDULED FOR 6/24/25 HOWEVER THERE IS A PROVIDER MEETING TODAY. PT HAS NEW APPT SCHEDULED FOR 7/11 HOWEVER WILL RUN OUT OF EVERYTHING BEFORE THEN.

## 2025-07-22 ENCOUNTER — OFFICE VISIT (OUTPATIENT)
Dept: MEDICAL GROUP | Facility: PHYSICIAN GROUP | Age: 60
End: 2025-07-22
Payer: OTHER GOVERNMENT

## 2025-07-22 VITALS
DIASTOLIC BLOOD PRESSURE: 78 MMHG | TEMPERATURE: 97.1 F | WEIGHT: 276 LBS | RESPIRATION RATE: 18 BRPM | OXYGEN SATURATION: 97 % | HEIGHT: 66 IN | BODY MASS INDEX: 44.36 KG/M2 | SYSTOLIC BLOOD PRESSURE: 134 MMHG | HEART RATE: 79 BPM

## 2025-07-22 DIAGNOSIS — Z00.00 WELL ADULT EXAM: ICD-10-CM

## 2025-07-22 DIAGNOSIS — E03.9 ACQUIRED HYPOTHYROIDISM: Chronic | ICD-10-CM

## 2025-07-22 DIAGNOSIS — M79.7 FIBROMYALGIA: Chronic | ICD-10-CM

## 2025-07-22 DIAGNOSIS — Z11.4 SCREENING FOR HIV (HUMAN IMMUNODEFICIENCY VIRUS): ICD-10-CM

## 2025-07-22 DIAGNOSIS — E78.5 DYSLIPIDEMIA ASSOCIATED WITH TYPE 2 DIABETES MELLITUS (HCC): Chronic | ICD-10-CM

## 2025-07-22 DIAGNOSIS — R92.30 DENSE BREAST: ICD-10-CM

## 2025-07-22 DIAGNOSIS — E11.69 TYPE 2 DIABETES MELLITUS WITH HYPERLIPIDEMIA (HCC): Primary | Chronic | ICD-10-CM

## 2025-07-22 DIAGNOSIS — Z12.31 ENCOUNTER FOR SCREENING MAMMOGRAM FOR MALIGNANT NEOPLASM OF BREAST: ICD-10-CM

## 2025-07-22 DIAGNOSIS — E11.59 HYPERTENSION ASSOCIATED WITH TYPE 2 DIABETES MELLITUS (HCC): Chronic | ICD-10-CM

## 2025-07-22 DIAGNOSIS — F33.1 MODERATE EPISODE OF RECURRENT MAJOR DEPRESSIVE DISORDER (HCC): ICD-10-CM

## 2025-07-22 DIAGNOSIS — G43.809 OTHER MIGRAINE WITHOUT STATUS MIGRAINOSUS, NOT INTRACTABLE: Chronic | ICD-10-CM

## 2025-07-22 DIAGNOSIS — K21.9 GASTROESOPHAGEAL REFLUX DISEASE WITHOUT ESOPHAGITIS: Chronic | ICD-10-CM

## 2025-07-22 DIAGNOSIS — I15.2 HYPERTENSION ASSOCIATED WITH TYPE 2 DIABETES MELLITUS (HCC): Chronic | ICD-10-CM

## 2025-07-22 DIAGNOSIS — E11.69 DYSLIPIDEMIA ASSOCIATED WITH TYPE 2 DIABETES MELLITUS (HCC): Chronic | ICD-10-CM

## 2025-07-22 DIAGNOSIS — E66.3 OVERWEIGHT: ICD-10-CM

## 2025-07-22 DIAGNOSIS — Z12.31 ENCOUNTER FOR SCREENING MAMMOGRAM FOR BREAST CANCER: ICD-10-CM

## 2025-07-22 DIAGNOSIS — E78.5 TYPE 2 DIABETES MELLITUS WITH HYPERLIPIDEMIA (HCC): Primary | Chronic | ICD-10-CM

## 2025-07-22 LAB
HBA1C MFR BLD: 6.4 % (ref ?–5.8)
POCT INT CON NEG: NEGATIVE
POCT INT CON POS: POSITIVE

## 2025-07-22 PROCEDURE — 3075F SYST BP GE 130 - 139MM HG: CPT | Performed by: INTERNAL MEDICINE

## 2025-07-22 PROCEDURE — 83036 HEMOGLOBIN GLYCOSYLATED A1C: CPT | Performed by: INTERNAL MEDICINE

## 2025-07-22 PROCEDURE — 92250 FUNDUS PHOTOGRAPHY W/I&R: CPT | Mod: 26 | Performed by: INTERNAL MEDICINE

## 2025-07-22 PROCEDURE — 99214 OFFICE O/P EST MOD 30 MIN: CPT | Mod: 25 | Performed by: INTERNAL MEDICINE

## 2025-07-22 PROCEDURE — 3078F DIAST BP <80 MM HG: CPT | Performed by: INTERNAL MEDICINE

## 2025-07-22 RX ORDER — TRAZODONE HYDROCHLORIDE 50 MG/1
TABLET ORAL
Qty: 90 TABLET | Refills: 3 | Status: SHIPPED
Start: 2025-07-22

## 2025-07-22 RX ORDER — ATORVASTATIN CALCIUM 20 MG/1
20 TABLET, FILM COATED ORAL DAILY
Qty: 90 TABLET | Refills: 3 | Status: SHIPPED
Start: 2025-07-22 | End: 2025-07-30

## 2025-07-22 RX ORDER — LEVOTHYROXINE SODIUM 200 UG/1
TABLET ORAL
Qty: 90 TABLET | Refills: 3 | Status: SHIPPED
Start: 2025-07-22

## 2025-07-22 RX ORDER — FAMOTIDINE 40 MG/1
40 TABLET, FILM COATED ORAL DAILY
Qty: 90 TABLET | Refills: 3 | Status: SHIPPED
Start: 2025-07-22

## 2025-07-22 RX ORDER — LEVOTHYROXINE SODIUM 25 UG/1
25 TABLET ORAL
Qty: 90 TABLET | Refills: 3 | Status: SHIPPED
Start: 2025-07-22 | End: 2025-07-30

## 2025-07-22 RX ORDER — SERTRALINE HYDROCHLORIDE 100 MG/1
TABLET, FILM COATED ORAL
Qty: 180 TABLET | Refills: 3 | Status: SHIPPED
Start: 2025-07-22

## 2025-07-22 RX ORDER — LEVOTHYROXINE SODIUM 200 UG/1
TABLET ORAL
Qty: 60 TABLET | Refills: 0 | Status: SHIPPED
Start: 2025-07-22 | End: 2025-07-22

## 2025-07-22 RX ORDER — TOPIRAMATE 50 MG/1
TABLET, FILM COATED ORAL
Qty: 90 TABLET | Refills: 3 | Status: SHIPPED
Start: 2025-07-22

## 2025-07-22 RX ORDER — LISINOPRIL 2.5 MG/1
2.5 TABLET ORAL DAILY
Qty: 90 TABLET | Refills: 3 | Status: SHIPPED
Start: 2025-07-22

## 2025-07-22 RX ORDER — LEVOTHYROXINE SODIUM 25 UG/1
25 TABLET ORAL
Qty: 90 TABLET | Refills: 3 | Status: SHIPPED
Start: 2025-07-22 | End: 2025-07-22

## 2025-07-22 NOTE — ASSESSMENT & PLAN NOTE
Body mass index is 44.55 kg/m².   Chronic condition.  Recommend pt to follow a healthy , well balance diet  Pt to continue with regular exercise activity and to maintain ideal weight.

## 2025-07-22 NOTE — ASSESSMENT & PLAN NOTE
Chronic condition.  Patient is taking pantoprazole.  The patient denies nausea vomiting or dysphagia.

## 2025-07-22 NOTE — ASSESSMENT & PLAN NOTE
This is a chronic condition.  The patient is presently on diet therapy.  A1c 6.4%.  The patient denies significant hypo or hyperglycemia.

## 2025-07-22 NOTE — ASSESSMENT & PLAN NOTE
This is chronic.  The patient takes Topamax 50 Mg daily.  Tolerating medication well.  No new symptoms reported.

## 2025-07-22 NOTE — ASSESSMENT & PLAN NOTE
Chronic condition.  Patient currently taking sertraline.  The patient tolerated medication well.  Patient is requesting Rx refill.  Patient reported a significant amount of stress in her life recently taking care of family members with medical illnesses.  In addition she reported that her mother and sister recently passed away.

## 2025-07-22 NOTE — PROGRESS NOTES
PRIMARY CARE CLINIC VISIT        Chief Complaint   Patient presents with    Follow-Up     Overdue follow up      Follow-up diabetes  Depression  Acid reflux  Migraine  Hypertension  Hyperlipidemia  Hypothyroidism  Fibromyalgia  Dense breast  Overweight  Request mammogram  Request lab test  Rx refills        History of Present Illness     Type 2 diabetes mellitus with hyperlipidemia (HCC)  This is a chronic condition.  The patient is presently on diet therapy.  A1c 6.4%.  The patient denies significant hypo or hyperglycemia.    GERD (gastroesophageal reflux disease)  Chronic condition.  Patient is taking pantoprazole.  The patient denies nausea vomiting or dysphagia.    Other migraine without status migrainosus, not intractable  This is chronic.  The patient takes Topamax 50 Mg daily.  Tolerating medication well.  No new symptoms reported.    Acquired hypothyroidism  Chronic condition.  The patient currently taking levothyroxine 225 mcg daily.  Patient is due for lab test.    Overweight  Body mass index is 44.55 kg/m².   Chronic condition.  Recommend pt to follow a healthy , well balance diet  Pt to continue with regular exercise activity and to maintain ideal weight.        Fibromyalgia  Chronic condition.  Patient was seen by pain specialist previously.  Patient currently not taking hydrocodone or Lyrica.    Dense breast  This is a chronic condition.    We discussed and recommended whole breast ultrasound to be done.  Informed the patient that this test may not be covered by her insurance.  Advised the patient to contact her insurance to confirm.  The order has been submitted for the patient today.    Moderate episode of recurrent major depressive disorder (HCC)  Chronic condition.  Patient currently taking sertraline.  The patient tolerated medication well.  Patient is requesting Rx refill.  Patient reported a significant amount of stress in her life recently taking care of family members with medical illnesses.   In addition she reported that her mother and sister recently passed away.    Dyslipidemia associated with type 2 diabetes mellitus (HCC)  Chronic condition.  Taking atorvastatin.  Patient is due for lab test.    Encounter for screening mammogram for breast cancer  Patient is due for mammogram.  Order submitted today.  The patient asymptomatic.    Hypertension associated with type 2 diabetes mellitus (HCC)  Chronic condition.  The patient currently taking lisinopril.  Patient denies chest pain shortness of breath palpitation.    Medications Ordered Prior to Encounter[1]     Allergies: Latex, Metformin, and Pregabalin    Current Medications and Prescriptions Ordered in Epic[2]    Past Medical History[3]    Past Surgical History[4]    Family History   Problem Relation Age of Onset    Hyperlipidemia Mother     Cancer Mother         ? vocal cord    Other Mother         multi organ failure/cardiac arrest    Diabetes Sister     Hypertension Sister     Hyperlipidemia Sister     Diabetes Brother     Hypertension Brother     Hyperlipidemia Brother     Heart Disease Maternal Aunt     Heart Disease Maternal Grandmother     Diabetes Paternal Grandmother     Stroke Neg Hx        Tobacco Use History[5]    Social History     Substance and Sexual Activity   Alcohol Use No    Alcohol/week: 0.0 oz    Comment: occasionally       Review of systems  As per HPI above. All other systems reviewed and negative.      Past Medical, Social, and Family history reviewed and updated in [facility]       LAB DATA:     I have independently reviewed / interpreted labs    Lab Results   Component Value Date/Time    HBA1C 6.4 (A) 07/22/2025 10:05 AM    HBA1C 5.8 04/10/2023 02:05 PM    HBA1C 7.7 (H) 05/11/2022 08:57 AM        Lab Results   Component Value Date/Time    WBC 6.6 02/24/2020 11:15 AM    HEMOGLOBIN 14.2 02/24/2020 11:15 AM    HEMATOCRIT 42.4 02/24/2020 11:15 AM    MCV 89.1 02/24/2020 11:15 AM    PLATELETCT 188 02/24/2020 11:15 AM       Lab Results  "  Component Value Date/Time    SODIUM 136 05/11/2022 08:57 AM    POTASSIUM 4.2 05/11/2022 08:57 AM    GLUCOSE 182 (H) 05/11/2022 08:57 AM    BUN 19 05/11/2022 08:57 AM    CREATININE 0.96 05/11/2022 08:57 AM       Lab Results   Component Value Date/Time    CHOLSTRLTOT 212 (H) 05/11/2022 08:57 AM    TRIGLYCERIDE 131 05/11/2022 08:57 AM    HDL 70 05/11/2022 08:57 AM     (H) 05/11/2022 08:57 AM       Lab Results   Component Value Date/Time    ALTSGPT 25 05/11/2022 08:57 AM          Objective     /78 (BP Location: Right arm, Patient Position: Sitting)   Pulse 79   Temp 36.2 °C (97.1 °F) (Temporal)   Resp 18   Ht 1.676 m (5' 6\")   Wt (!) 125 kg (276 lb)   SpO2 97%    Body mass index is 44.55 kg/m².    General: alert in no apparent distress.  Cardiovascular: regular rate and rhythm  Pulmonary: lungs : no wheezing   Gastrointestinal: BS present.   Monofilament testing with a 10 gram force: sensation intact: intact bilaterally  Visual Inspection: Feet without maceration, ulcers, fissures.  Pedal pulses: decreased bilaterally     Assessment and Plan     1. Type 2 diabetes mellitus with hyperlipidemia (HCC)  Stable condition per A1c 6.4%.  Advised healthy diet and exercise.  Clinical notes:   -Discussed with the patient goals for Diabetes management:   HbA1C < 7% /  Fasting BG   /  2 hrs post meal BG below 160  -Reviewed pathophysiology of diabetes and the importance of glycemic control to reduce the risk of diabetes related complications   Microvascular: retinopathy, neuropathy, nephropathy  Macrovascular: heart attack, stroke, peripheral vascular dz  -Advised pt to monitor sugar closely  -Counseled pt the importance of recognizing and treating hypoglycemia.   -The importance of increasing physical activity to improve diabetes control  discussed with the patient.   -Patient was also educated on changing diet and making better choices to help control blood sugar.          - POCT Hemoglobin A1C  - Basic " Metabolic Panel; Future  - Diabetic Monofilament LE Exam  - POCT Retinal Eye Exam    2. Moderate episode of recurrent major depressive disorder (HCC)  - traZODone (DESYREL) 50 MG Tab; TAKE 1 TABLET BY MOUTH EVERY NIGHT AT BEDTIME  Dispense: 90 Tablet; Refill: 3  - sertraline (ZOLOFT) 100 MG Tab; Take 2 tabs in AM  Dispense: 180 Tablet; Refill: 3  Chronic condition.  Uncontrolled.  The patient reported high level of stress recently.  Counseling given today.  Continue sertraline and trazodone.  Potential side effect medication discussed with the patient.    3. Gastroesophageal reflux disease without esophagitis  - famotidine (PEPCID) 40 MG Tab; Take 1 Tablet by mouth every day.  Dispense: 90 Tablet; Refill: 3  Chronic stable.  Continue famotidine 40 Mg daily.    4. Other migraine without status migrainosus, not intractable  Chronic stable.  Continue Topamax 50 mg daily.    5. Acquired hypothyroidism  Chronic condition.  Current status unclear.  Lab test ordered to check TSH.  T4.    6. Hypertension associated with type 2 diabetes mellitus (HCC)  - CBC WITH DIFFERENTIAL; Future  - TSH WITH REFLEX TO FT4; Future  - MICROALBUMIN CREAT RATIO URINE; Future  Stable.  Continue lisinopril 2.5 Mg daily.  Patient to continue to monitor blood pressure regular basis at home    7. Dyslipidemia associated with type 2 diabetes mellitus (HCC)  - Lipid Profile; Future  - ALANINE AMINO-TRANS; Future  Chronic condition.  Current status unclear.  Lab test ordered to check lipid panel.    8. Fibromyalgia  Chronic condition.  Stable.  Currently not on therapy.  Continue to monitor    9. Dense breast  - US-SCREENING WHOLE BREAST BILATERAL (3D SCREENING); Future  Chronic condition.  The patient requests whole breast ultrasound to be done.  Order submitted today.  The patient asymptomatic    10. Overweight  Chronic condition. .  Discussed healthy diet and exercise.    11. Encounter for screening mammogram for malignant neoplasm of  breast  Patient reported that she schedule an appointment for mammogram.    12. Patient also request blood typing to be done.  Order submitted.  Informed the patient to check with her insurance to confirm whether or not this test will be covered.              Time spent:  42   minutes     Reviewed medical records including:  April 10, 2023 medical office note  December 14, 2022 medical office note  October 27, 2021 medical office note  July 6, 2022 office note  February 24, 2020 medical office note  Lab data: April 10, 2023, May 11, 2022, April 27, 2022, March 2, 2022, January 19, 2022    Total time includes face to face time and non-face to face time.  Chart review before the visit, the actual patient visit, and time spent on documentation in the EMR after the visit.  Chart review/prep, review of other providers' records, Independent review of imagings/labs ,  time for history/examination , pt's counseling/education, ordering, prescribing, treatment plan discussed with patient, and care coordination.                    Please note that this dictation was created using voice recognition software. I am continuously working with the software to minimize the number of voice recognition errors, and I have made every attempt to manually correct the errors within my dictation. However, errors related to this voice recognition software may still exist and should be interpreted within the appropriate context.     Sha Pereira MD  Internal Medicine  [facility]       [1]   Current Outpatient Medications on File Prior to Visit   Medication Sig Dispense Refill    ibuprofen (MOTRIN) 800 MG Tab Take 1 Tablet by mouth every 8 hours as needed for Moderate Pain. 60 Tablet 1    pantoprazole (PROTONIX) 40 MG Tablet Delayed Response Take 1 Tablet by mouth every day. 60 Tablet 0     No current facility-administered medications on file prior to visit.   [2]   Current Outpatient Medications Ordered in Epic   Medication  Sig Dispense Refill    topiramate (TOPAMAX) 50 MG tablet TAKE 1 TABLET BY MOUTH DAILY 90 Tablet 3    traZODone (DESYREL) 50 MG Tab TAKE 1 TABLET BY MOUTH EVERY NIGHT AT BEDTIME 90 Tablet 3    atorvastatin (LIPITOR) 20 MG Tab Take 1 Tablet by mouth every day. 90 Tablet 3    famotidine (PEPCID) 40 MG Tab Take 1 Tablet by mouth every day. 90 Tablet 3    levothyroxine (SYNTHROID) 200 MCG Tab TAKE 1 TABLET BY MOUTH EVERY MORNING ON AN EMPTY STOMACH 60 Tablet 0    levothyroxine (SYNTHROID) 25 MCG Tab Take 1 Tablet by mouth every morning on an empty stomach. Only 1 refill authorized. Needs appointment for followup with PCP 90 Tablet 3    lisinopril (PRINIVIL) 2.5 MG Tab Take 1 Tablet by mouth every day. 90 Tablet 3    sertraline (ZOLOFT) 100 MG Tab Take 2 tabs in  Tablet 3    ibuprofen (MOTRIN) 800 MG Tab Take 1 Tablet by mouth every 8 hours as needed for Moderate Pain. 60 Tablet 1    pantoprazole (PROTONIX) 40 MG Tablet Delayed Response Take 1 Tablet by mouth every day. 60 Tablet 0     No current Epic-ordered facility-administered medications on file.   [3]   Past Medical History:  Diagnosis Date    Anxiety disorder 2/1/2011    Family history of diabetes mellitus (DM) 2/1/2011    GERD (gastroesophageal reflux disease) 2/1/2011    Migraine headache 2/1/2011    PCOS (polycystic ovarian syndrome) 2/1/2011   [4]   Past Surgical History:  Procedure Laterality Date    ABDOMINAL EXPLORATION      CERVICAL DISK AND FUSION ANTERIOR      LAPAROSCOPY      LUMBAR LAMINECTOMY DISKECTOMY      TONSILLECTOMY AND ADENOIDECTOMY     [5]   Social History  Tobacco Use   Smoking Status Never   Smokeless Tobacco Never   Tobacco Comments    avoid all tobacco products

## 2025-07-22 NOTE — ASSESSMENT & PLAN NOTE
Chronic condition.  The patient currently taking lisinopril.  Patient denies chest pain shortness of breath palpitation.

## 2025-07-22 NOTE — ASSESSMENT & PLAN NOTE
This is a chronic condition.    We discussed and recommended whole breast ultrasound to be done.  Informed the patient that this test may not be covered by her insurance.  Advised the patient to contact her insurance to confirm.  The order has been submitted for the patient today.

## 2025-07-22 NOTE — ASSESSMENT & PLAN NOTE
Chronic condition.  The patient currently taking levothyroxine 225 mcg daily.  Patient is due for lab test.

## 2025-07-22 NOTE — ASSESSMENT & PLAN NOTE
Chronic condition.  Patient was seen by pain specialist previously.  Patient currently not taking hydrocodone or Lyrica.

## 2025-07-23 LAB — RETINAL SCREEN: NEGATIVE

## 2025-07-24 DIAGNOSIS — E11.3292 MILD NONPROLIFERATIVE DIABETIC RETINOPATHY OF LEFT EYE WITHOUT MACULAR EDEMA ASSOCIATED WITH TYPE 2 DIABETES MELLITUS (HCC): Primary | ICD-10-CM

## 2025-07-24 PROBLEM — E11.319 DIABETIC RETINOPATHY OF LEFT EYE (HCC): Status: ACTIVE | Noted: 2025-07-24

## 2025-07-24 PROBLEM — E11.319 DIABETIC RETINOPATHY OF LEFT EYE (HCC): Chronic | Status: ACTIVE | Noted: 2025-07-24

## 2025-07-25 NOTE — Clinical Note
REFERRAL APPROVAL NOTICE         Sent on July 25, 2025                   Gail Elias  [address] [address]                   Dear Ms. Elias,    After a careful review of the medical information and benefit coverage, [facility] has processed your referral. See below for additional details.    If applicable, you must be actively enrolled with your insurance for coverage of the authorized service. If you have any questions regarding your coverage, please contact your insurance directly.    REFERRAL INFORMATION   Referral #:  [id number]  Referred-To Provider    Referred-By Provider:  Ophthalmology    Sha Pereira M.D.    [facility] [address] [place] [address]  [phone] [id number]  [place] [address]  [phone]    Referral Start Date:  07/24/2025  Referral End Date:   07/24/2026             SCHEDULING  If you do not already have an appointment, please call [phone] to make an appointment.     MORE INFORMATION  If you do not already have a [facility] account, sign up at: [email or web address]  You can access your medical information, make appointments, see lab results, billing information, and more.  If you have questions regarding this referral, please contact  the [facility] Referrals department at:             [phone]. Monday - Friday 8:00AM - 5:00PM.     Sincerely,    [facility]

## 2025-07-29 ENCOUNTER — HOSPITAL ENCOUNTER (OUTPATIENT)
Dept: LAB | Facility: MEDICAL CENTER | Age: 60
End: 2025-07-29
Attending: INTERNAL MEDICINE
Payer: OTHER GOVERNMENT

## 2025-07-29 DIAGNOSIS — Z00.00 WELL ADULT EXAM: ICD-10-CM

## 2025-07-29 DIAGNOSIS — E11.69 DYSLIPIDEMIA ASSOCIATED WITH TYPE 2 DIABETES MELLITUS (HCC): Chronic | ICD-10-CM

## 2025-07-29 DIAGNOSIS — E11.59 HYPERTENSION ASSOCIATED WITH TYPE 2 DIABETES MELLITUS (HCC): Chronic | ICD-10-CM

## 2025-07-29 DIAGNOSIS — E03.9 ACQUIRED HYPOTHYROIDISM: Chronic | ICD-10-CM

## 2025-07-29 DIAGNOSIS — E78.5 TYPE 2 DIABETES MELLITUS WITH HYPERLIPIDEMIA (HCC): Chronic | ICD-10-CM

## 2025-07-29 DIAGNOSIS — E11.69 TYPE 2 DIABETES MELLITUS WITH HYPERLIPIDEMIA (HCC): Chronic | ICD-10-CM

## 2025-07-29 DIAGNOSIS — I15.2 HYPERTENSION ASSOCIATED WITH TYPE 2 DIABETES MELLITUS (HCC): Chronic | ICD-10-CM

## 2025-07-29 DIAGNOSIS — Z11.4 SCREENING FOR HIV (HUMAN IMMUNODEFICIENCY VIRUS): ICD-10-CM

## 2025-07-29 DIAGNOSIS — E78.5 DYSLIPIDEMIA ASSOCIATED WITH TYPE 2 DIABETES MELLITUS (HCC): Chronic | ICD-10-CM

## 2025-07-29 LAB
ABO GROUP BLD: NORMAL
ALT SERPL-CCNC: 22 U/L (ref 2–50)
ANION GAP SERPL CALC-SCNC: 14 MMOL/L (ref 7–16)
BASOPHILS # BLD AUTO: 1 % (ref 0–1.8)
BASOPHILS # BLD: 0.05 K/UL (ref 0–0.12)
BUN SERPL-MCNC: 13 MG/DL (ref 8–22)
CALCIUM SERPL-MCNC: 9.9 MG/DL (ref 8.5–10.5)
CHLORIDE SERPL-SCNC: 105 MMOL/L (ref 96–112)
CHOLEST SERPL-MCNC: 243 MG/DL (ref 100–199)
CO2 SERPL-SCNC: 22 MMOL/L (ref 20–33)
CREAT SERPL-MCNC: 1.2 MG/DL (ref 0.5–1.4)
CREAT UR-MCNC: 482 MG/DL
EOSINOPHIL # BLD AUTO: 0.23 K/UL (ref 0–0.51)
EOSINOPHIL NFR BLD: 4.4 % (ref 0–6.9)
ERYTHROCYTE [DISTWIDTH] IN BLOOD BY AUTOMATED COUNT: 51.3 FL (ref 35.9–50)
GFR SERPLBLD CREATININE-BSD FMLA CKD-EPI: 52 ML/MIN/1.73 M 2
GLUCOSE SERPL-MCNC: 153 MG/DL (ref 65–99)
HCT VFR BLD AUTO: 43.6 % (ref 37–47)
HDLC SERPL-MCNC: 75 MG/DL
HGB BLD-MCNC: 14.4 G/DL (ref 12–16)
HIV 1+2 AB+HIV1 P24 AG SERPL QL IA: NORMAL
IMM GRANULOCYTES # BLD AUTO: 0.02 K/UL (ref 0–0.11)
IMM GRANULOCYTES NFR BLD AUTO: 0.4 % (ref 0–0.9)
LDLC SERPL CALC-MCNC: 149 MG/DL
LYMPHOCYTES # BLD AUTO: 1.74 K/UL (ref 1–4.8)
LYMPHOCYTES NFR BLD: 33.1 % (ref 22–41)
MCH RBC QN AUTO: 31.4 PG (ref 27–33)
MCHC RBC AUTO-ENTMCNC: 33 G/DL (ref 32.2–35.5)
MCV RBC AUTO: 95.2 FL (ref 81.4–97.8)
MICROALBUMIN UR-MCNC: 22.4 MG/DL
MICROALBUMIN/CREAT UR: 46 MG/G (ref 0–30)
MONOCYTES # BLD AUTO: 0.26 K/UL (ref 0–0.85)
MONOCYTES NFR BLD AUTO: 4.9 % (ref 0–13.4)
NEUTROPHILS # BLD AUTO: 2.96 K/UL (ref 1.82–7.42)
NEUTROPHILS NFR BLD: 56.2 % (ref 44–72)
NRBC # BLD AUTO: 0 K/UL
NRBC BLD-RTO: 0 /100 WBC (ref 0–0.2)
PLATELET # BLD AUTO: 171 K/UL (ref 164–446)
PMV BLD AUTO: 11 FL (ref 9–12.9)
POTASSIUM SERPL-SCNC: 4.4 MMOL/L (ref 3.6–5.5)
RBC # BLD AUTO: 4.58 M/UL (ref 4.2–5.4)
RH BLD: NORMAL
SODIUM SERPL-SCNC: 141 MMOL/L (ref 135–145)
T4 FREE SERPL-MCNC: 0.11 NG/DL (ref 0.93–1.7)
TRIGL SERPL-MCNC: 95 MG/DL (ref 0–149)
TSH SERPL DL<=0.005 MIU/L-ACNC: 314 UIU/ML (ref 0.38–5.33)
WBC # BLD AUTO: 5.3 K/UL (ref 4.8–10.8)

## 2025-07-29 PROCEDURE — 80048 BASIC METABOLIC PNL TOTAL CA: CPT

## 2025-07-29 PROCEDURE — 84439 ASSAY OF FREE THYROXINE: CPT

## 2025-07-29 PROCEDURE — 86901 BLOOD TYPING SEROLOGIC RH(D): CPT

## 2025-07-29 PROCEDURE — 80061 LIPID PANEL: CPT

## 2025-07-29 PROCEDURE — 36415 COLL VENOUS BLD VENIPUNCTURE: CPT

## 2025-07-29 PROCEDURE — 82570 ASSAY OF URINE CREATININE: CPT

## 2025-07-29 PROCEDURE — 85025 COMPLETE CBC W/AUTO DIFF WBC: CPT

## 2025-07-29 PROCEDURE — 87389 HIV-1 AG W/HIV-1&-2 AB AG IA: CPT

## 2025-07-29 PROCEDURE — 84460 ALANINE AMINO (ALT) (SGPT): CPT

## 2025-07-29 PROCEDURE — 86900 BLOOD TYPING SEROLOGIC ABO: CPT

## 2025-07-29 PROCEDURE — 84443 ASSAY THYROID STIM HORMONE: CPT

## 2025-07-29 PROCEDURE — 82043 UR ALBUMIN QUANTITATIVE: CPT

## 2025-07-30 PROBLEM — N28.9 RENAL INSUFFICIENCY: Status: ACTIVE | Noted: 2025-07-30
